# Patient Record
Sex: FEMALE | Race: WHITE | NOT HISPANIC OR LATINO | URBAN - METROPOLITAN AREA
[De-identification: names, ages, dates, MRNs, and addresses within clinical notes are randomized per-mention and may not be internally consistent; named-entity substitution may affect disease eponyms.]

---

## 2017-01-12 ENCOUNTER — APPOINTMENT (OUTPATIENT)
Dept: URBAN - METROPOLITAN AREA CLINIC 200 | Age: 57
Setting detail: DERMATOLOGY
End: 2017-01-12

## 2017-01-12 DIAGNOSIS — K13.0 DISEASES OF LIPS: ICD-10-CM

## 2017-01-12 PROCEDURE — OTHER COUNSELING: OTHER

## 2017-01-12 PROCEDURE — 99212 OFFICE O/P EST SF 10 MIN: CPT

## 2017-01-12 PROCEDURE — OTHER PRESCRIPTION: OTHER

## 2017-01-12 RX ORDER — ALCLOMETASONE DIPROPIONATE 0.5 MG/G
CREAM TOPICAL
Qty: 1 | Refills: 3 | Status: ERX

## 2017-01-12 RX ORDER — KETOCONAZOLE 20 MG/G
CREAM TOPICAL BID
Qty: 1 | Refills: 3 | Status: ERX

## 2017-01-12 RX ORDER — FLUCONAZOLE 150 MG/1
TABLET ORAL
Qty: 2 | Refills: 0 | Status: ERX

## 2017-01-12 ASSESSMENT — LOCATION ZONE DERM: LOCATION ZONE: LIP

## 2017-01-12 ASSESSMENT — LOCATION DETAILED DESCRIPTION DERM
LOCATION DETAILED: LEFT SUPERIOR VERMILION BORDER
LOCATION DETAILED: RIGHT ORAL COMMISSURE

## 2017-01-12 ASSESSMENT — LOCATION SIMPLE DESCRIPTION DERM
LOCATION SIMPLE: RIGHT LIP
LOCATION SIMPLE: LEFT UPPER LIP

## 2017-01-18 ENCOUNTER — APPOINTMENT (OUTPATIENT)
Dept: URBAN - METROPOLITAN AREA CLINIC 200 | Age: 57
Setting detail: DERMATOLOGY
End: 2017-01-19

## 2017-01-18 DIAGNOSIS — L71.0 PERIORAL DERMATITIS: ICD-10-CM

## 2017-01-18 PROCEDURE — OTHER COUNSELING: OTHER

## 2017-01-18 PROCEDURE — OTHER PRESCRIPTION: OTHER

## 2017-01-18 PROCEDURE — 99212 OFFICE O/P EST SF 10 MIN: CPT

## 2017-01-18 RX ORDER — DOXYCYCLINE HYCLATE 50 MG/1
CAPSULE, GELATIN COATED ORAL
Qty: 60 | Refills: 2 | Status: ERX

## 2017-01-18 ASSESSMENT — LOCATION SIMPLE DESCRIPTION DERM
LOCATION SIMPLE: RIGHT LIP
LOCATION SIMPLE: RIGHT CHEEK
LOCATION SIMPLE: RIGHT CHEEK

## 2017-01-18 ASSESSMENT — LOCATION ZONE DERM
LOCATION ZONE: FACE
LOCATION ZONE: FACE
LOCATION ZONE: LIP

## 2017-01-18 ASSESSMENT — LOCATION DETAILED DESCRIPTION DERM
LOCATION DETAILED: RIGHT ORAL COMMISSURE
LOCATION DETAILED: RIGHT CENTRAL BUCCAL CHEEK
LOCATION DETAILED: RIGHT SUPERIOR MEDIAL BUCCAL CHEEK

## 2017-02-15 ENCOUNTER — APPOINTMENT (OUTPATIENT)
Dept: URBAN - METROPOLITAN AREA CLINIC 200 | Age: 57
Setting detail: DERMATOLOGY
End: 2017-02-15

## 2017-02-15 DIAGNOSIS — L70.0 ACNE VULGARIS: ICD-10-CM

## 2017-02-15 PROCEDURE — OTHER INJECTION: OTHER

## 2017-02-15 PROCEDURE — 96372 THER/PROPH/DIAG INJ SC/IM: CPT

## 2017-02-15 ASSESSMENT — LOCATION DETAILED DESCRIPTION DERM
LOCATION DETAILED: RIGHT INFERIOR MEDIAL MALAR CHEEK
LOCATION DETAILED: RIGHT SUPERIOR MEDIAL BUCCAL CHEEK

## 2017-02-15 ASSESSMENT — LOCATION SIMPLE DESCRIPTION DERM: LOCATION SIMPLE: RIGHT CHEEK

## 2017-02-15 ASSESSMENT — LOCATION ZONE DERM: LOCATION ZONE: FACE

## 2017-03-01 ENCOUNTER — RX ONLY (RX ONLY)
Age: 57
End: 2017-03-01

## 2017-03-01 RX ORDER — SULFAMETHOXAZOLE AND TRIMETHOPRIM 400; 80 MG/1; MG/1
TABLET ORAL
Qty: 60 | Refills: 6 | Status: ERX

## 2017-05-19 ENCOUNTER — RX ONLY (RX ONLY)
Age: 57
End: 2017-05-19

## 2017-05-19 RX ORDER — BIMATOPROST 0.3 MG/ML
SOLUTION/ DROPS OPHTHALMIC
Qty: 1 | Refills: 3 | Status: ERX

## 2017-07-22 ENCOUNTER — RX ONLY (RX ONLY)
Age: 57
End: 2017-07-22

## 2017-07-22 RX ORDER — VALACYCLOVIR HYDROCHLORIDE 1 G/1
TABLET, FILM COATED ORAL
Qty: 12 | Refills: 3 | Status: ERX

## 2017-07-22 RX ORDER — VALACYCLOVIR HYDROCHLORIDE 500 MG/1
TABLET, FILM COATED ORAL
Qty: 15 | Refills: 11 | Status: ERX | COMMUNITY
Start: 2017-07-22

## 2017-08-16 ENCOUNTER — RX ONLY (RX ONLY)
Age: 57
End: 2017-08-16

## 2017-08-16 RX ORDER — TRETINOIN 0.5 MG/G
CREAM TOPICAL
Qty: 1 | Refills: 7 | Status: ERX

## 2017-09-29 ENCOUNTER — RX ONLY (RX ONLY)
Age: 57
End: 2017-09-29

## 2017-09-29 ENCOUNTER — APPOINTMENT (OUTPATIENT)
Dept: URBAN - METROPOLITAN AREA CLINIC 200 | Age: 57
Setting detail: DERMATOLOGY
End: 2017-10-02

## 2017-09-29 DIAGNOSIS — L71.0 PERIORAL DERMATITIS: ICD-10-CM

## 2017-09-29 DIAGNOSIS — L72.8 OTHER FOLLICULAR CYSTS OF THE SKIN AND SUBCUTANEOUS TISSUE: ICD-10-CM

## 2017-09-29 PROCEDURE — OTHER PRESCRIPTION: OTHER

## 2017-09-29 PROCEDURE — 11900 INJECT SKIN LESIONS </W 7: CPT

## 2017-09-29 PROCEDURE — OTHER INTRALESIONAL KENALOG: OTHER

## 2017-09-29 PROCEDURE — 99212 OFFICE O/P EST SF 10 MIN: CPT | Mod: 25

## 2017-09-29 RX ORDER — TRIMETHOPRIM 100 MG/1
TABLET ORAL
Qty: 60 | Refills: 5 | Status: ERX

## 2017-09-29 RX ORDER — PIMECROLIMUS 10 MG/G
CREAM TOPICAL
Qty: 1 | Refills: 3 | Status: ERX

## 2017-09-29 ASSESSMENT — LOCATION DETAILED DESCRIPTION DERM
LOCATION DETAILED: LEFT CENTRAL BUCCAL CHEEK
LOCATION DETAILED: RIGHT ORAL COMMISSURE
LOCATION DETAILED: LEFT UPPER CUTANEOUS LIP
LOCATION DETAILED: RIGHT INFERIOR CENTRAL MALAR CHEEK

## 2017-09-29 ASSESSMENT — LOCATION SIMPLE DESCRIPTION DERM
LOCATION SIMPLE: RIGHT CHEEK
LOCATION SIMPLE: LEFT LIP
LOCATION SIMPLE: RIGHT LIP
LOCATION SIMPLE: LEFT CHEEK

## 2017-09-29 ASSESSMENT — INVESTIGATOR STATIC GLOBAL ASSESSMENT
IN YOUR EXPERIENCE, AMONG ALL PATIENTS YOU HAVE SEEN WITH THIS CONDITION, HOW SEVERE IS THIS PATIENT'S CONDITION?: MILD TO MODERATE

## 2017-09-29 ASSESSMENT — LOCATION ZONE DERM
LOCATION ZONE: FACE
LOCATION ZONE: LIP

## 2017-09-29 NOTE — PROCEDURE: INTRALESIONAL KENALOG
Detail Level: Detailed
Expiration Date (Optional): 05/2018
Concentration Of Solution Injected (Mg/Ml): 10.0
Administered By (Optional): ar
X Size Of Lesion In Cm (Optional): 0
Include Z78.9 (Other Specified Conditions Influencing Health Status) As An Associated Diagnosis?: No
Medical Necessity Clause: This procedure was medically necessary because the lesions that were treated were:
Kenalog Preparation: Kenalog
Consent: The risks of atrophy were reviewed with the patient.
Total Volume Injected (Ccs- Only Use Numbers And Decimals): .1

## 2017-10-20 ENCOUNTER — RX ONLY (RX ONLY)
Age: 57
End: 2017-10-20

## 2017-10-20 RX ORDER — UREA 0.4 G/G
OINTMENT TOPICAL
Qty: 1 | Refills: 3 | Status: ERX

## 2017-10-24 ENCOUNTER — RX ONLY (RX ONLY)
Age: 57
End: 2017-10-24

## 2017-10-24 RX ORDER — UREA 40 G/100G
CREAM TOPICAL
Qty: 1 | Refills: 7 | Status: ERX

## 2017-12-05 ENCOUNTER — APPOINTMENT (OUTPATIENT)
Dept: URBAN - METROPOLITAN AREA CLINIC 200 | Age: 57
Setting detail: DERMATOLOGY
End: 2017-12-11

## 2017-12-05 DIAGNOSIS — B35.1 TINEA UNGUIUM: ICD-10-CM

## 2017-12-05 DIAGNOSIS — L57.8 OTHER SKIN CHANGES DUE TO CHRONIC EXPOSURE TO NONIONIZING RADIATION: ICD-10-CM

## 2017-12-05 DIAGNOSIS — D22 MELANOCYTIC NEVI: ICD-10-CM

## 2017-12-05 PROBLEM — D22.5 MELANOCYTIC NEVI OF TRUNK: Status: ACTIVE | Noted: 2017-12-05

## 2017-12-05 PROBLEM — J30.1 ALLERGIC RHINITIS DUE TO POLLEN: Status: ACTIVE | Noted: 2017-12-05

## 2017-12-05 PROCEDURE — OTHER NAIL CLIPPING FOR PAS: OTHER

## 2017-12-05 PROCEDURE — OTHER PRESCRIPTION: OTHER

## 2017-12-05 PROCEDURE — OTHER COUNSELING: OTHER

## 2017-12-05 PROCEDURE — 99213 OFFICE O/P EST LOW 20 MIN: CPT

## 2017-12-05 RX ORDER — EFINACONAZOLE 100 MG/ML
SOLUTION TOPICAL
Qty: 1 | Refills: 6

## 2017-12-05 ASSESSMENT — LOCATION ZONE DERM
LOCATION ZONE: TOENAIL
LOCATION ZONE: TOE
LOCATION ZONE: TRUNK

## 2017-12-05 ASSESSMENT — LOCATION DETAILED DESCRIPTION DERM
LOCATION DETAILED: LEFT DISTAL PLANTAR 3RD TOE
LOCATION DETAILED: LEFT 3RD TOENAIL
LOCATION DETAILED: UPPER STERNUM

## 2017-12-05 ASSESSMENT — LOCATION SIMPLE DESCRIPTION DERM
LOCATION SIMPLE: CHEST
LOCATION SIMPLE: LEFT 3RD TOE
LOCATION SIMPLE: LEFT 3RD TOE

## 2018-01-10 RX ORDER — EFINACONAZOLE 100 MG/ML
SOLUTION TOPICAL
Qty: 1 | Refills: 6 | Status: ERX

## 2018-01-12 ENCOUNTER — RX ONLY (RX ONLY)
Age: 58
End: 2018-01-12

## 2018-01-12 RX ORDER — CICLOPIROX 80 MG/ML
SOLUTION TOPICAL
Qty: 1 | Refills: 3 | Status: ERX

## 2018-04-04 ENCOUNTER — RX ONLY (RX ONLY)
Age: 58
End: 2018-04-04

## 2018-04-04 RX ORDER — BIMATOPROST 0.3 MG/ML
SOLUTION/ DROPS OPHTHALMIC
Qty: 1 | Refills: 3 | Status: ERX

## 2018-07-27 ENCOUNTER — RX ONLY (RX ONLY)
Age: 58
End: 2018-07-27

## 2018-07-27 RX ORDER — BIMATOPROST 0.3 MG/ML
SOLUTION/ DROPS OPHTHALMIC
Qty: 1 | Refills: 3 | Status: ERX

## 2018-08-22 ENCOUNTER — RX ONLY (RX ONLY)
Age: 58
End: 2018-08-22

## 2018-08-22 RX ORDER — TRETINOIN 0.5 MG/G
CREAM TOPICAL
Qty: 1 | Refills: 7 | Status: ERX

## 2018-08-22 RX ORDER — ALUMINUM CHLORIDE 20 %
SOLUTION, NON-ORAL TOPICAL
Qty: 1 | Refills: 0 | Status: ERX

## 2018-10-04 ENCOUNTER — TRANSCRIBE ORDERS (OUTPATIENT)
Dept: SCHEDULING | Age: 58
End: 2018-10-04

## 2018-10-04 DIAGNOSIS — Z12.31 ENCOUNTER FOR SCREENING MAMMOGRAM FOR MALIGNANT NEOPLASM OF BREAST: Primary | ICD-10-CM

## 2018-10-04 DIAGNOSIS — M85.80 OTHER SPECIFIED DISORDERS OF BONE DENSITY AND STRUCTURE, UNSPECIFIED SITE: ICD-10-CM

## 2018-10-04 DIAGNOSIS — Z01.419 ENCOUNTER FOR GYNECOLOGICAL EXAMINATION WITHOUT ABNORMAL FINDING: ICD-10-CM

## 2018-10-19 ENCOUNTER — HOSPITAL ENCOUNTER (OUTPATIENT)
Dept: RADIOLOGY | Facility: HOSPITAL | Age: 58
Discharge: HOME | End: 2018-10-19
Attending: OBSTETRICS & GYNECOLOGY
Payer: COMMERCIAL

## 2018-10-19 DIAGNOSIS — Z12.31 ENCOUNTER FOR SCREENING MAMMOGRAM FOR MALIGNANT NEOPLASM OF BREAST: ICD-10-CM

## 2018-10-19 PROCEDURE — 77067 SCR MAMMO BI INCL CAD: CPT

## 2018-10-30 ENCOUNTER — APPOINTMENT (OUTPATIENT)
Dept: URBAN - METROPOLITAN AREA CLINIC 200 | Age: 58
Setting detail: DERMATOLOGY
End: 2018-10-30

## 2018-10-30 ENCOUNTER — APPOINTMENT (OUTPATIENT)
Dept: URBAN - METROPOLITAN AREA CLINIC 200 | Age: 58
Setting detail: DERMATOLOGY
End: 2018-10-31

## 2018-10-30 DIAGNOSIS — B36.0 PITYRIASIS VERSICOLOR: ICD-10-CM

## 2018-10-30 DIAGNOSIS — L57.8 OTHER SKIN CHANGES DUE TO CHRONIC EXPOSURE TO NONIONIZING RADIATION: ICD-10-CM

## 2018-10-30 DIAGNOSIS — L65.9 NONSCARRING HAIR LOSS, UNSPECIFIED: ICD-10-CM

## 2018-10-30 DIAGNOSIS — L70.0 ACNE VULGARIS: ICD-10-CM

## 2018-10-30 PROCEDURE — OTHER COUNSELING: OTHER

## 2018-10-30 PROCEDURE — 99213 OFFICE O/P EST LOW 20 MIN: CPT

## 2018-10-30 PROCEDURE — OTHER ORDER TESTS: OTHER

## 2018-10-30 PROCEDURE — OTHER PRESCRIPTION: OTHER

## 2018-10-30 PROCEDURE — OTHER MIPS QUALITY: OTHER

## 2018-10-30 PROCEDURE — OTHER PRESCRIPTION MEDICATION MANAGEMENT: OTHER

## 2018-10-30 RX ORDER — KETOCONAZOLE 20 MG/G
CREAM TOPICAL
Qty: 1 | Refills: 6 | Status: ERX

## 2018-10-30 ASSESSMENT — LOCATION DETAILED DESCRIPTION DERM
LOCATION DETAILED: LEFT MEDIAL SUPERIOR CHEST
LOCATION DETAILED: RIGHT SUPERIOR MEDIAL BUCCAL CHEEK
LOCATION DETAILED: RIGHT MEDIAL FRONTAL SCALP
LOCATION DETAILED: LEFT MEDIAL FRONTAL SCALP

## 2018-10-30 ASSESSMENT — LOCATION ZONE DERM
LOCATION ZONE: TRUNK
LOCATION ZONE: SCALP
LOCATION ZONE: FACE

## 2018-10-30 ASSESSMENT — LOCATION SIMPLE DESCRIPTION DERM
LOCATION SIMPLE: RIGHT SCALP
LOCATION SIMPLE: RIGHT CHEEK
LOCATION SIMPLE: LEFT SCALP
LOCATION SIMPLE: CHEST

## 2018-10-30 NOTE — PROCEDURE: PRESCRIPTION MEDICATION MANAGEMENT
Detail Level: Zone
Render In Strict Bullet Format?: No
Modify Regimen: increase spironolactone to 100 mg bid
Discontinue Regimen: 5% minoxidil
Initiate Treatment: 2% minoxidil mixed with latisse

## 2019-02-04 ENCOUNTER — TRANSCRIBE ORDERS (OUTPATIENT)
Dept: SCHEDULING | Age: 59
End: 2019-02-04

## 2019-02-04 DIAGNOSIS — K59.09 OTHER CONSTIPATION: Primary | ICD-10-CM

## 2019-02-06 ENCOUNTER — OFFICE VISIT (OUTPATIENT)
Dept: UROGYNECOLOGY | Facility: CLINIC | Age: 59
End: 2019-02-06
Payer: COMMERCIAL

## 2019-02-06 VITALS — DIASTOLIC BLOOD PRESSURE: 70 MMHG | WEIGHT: 123 LBS | SYSTOLIC BLOOD PRESSURE: 130 MMHG

## 2019-02-06 DIAGNOSIS — N39.42 URINARY INCONTINENCE WITHOUT SENSORY AWARENESS: Primary | ICD-10-CM

## 2019-02-06 DIAGNOSIS — Z96.0 HISTORY OF PUBOVAGINAL SLING: ICD-10-CM

## 2019-02-06 DIAGNOSIS — K59.01 SLOW TRANSIT CONSTIPATION: ICD-10-CM

## 2019-02-06 PROBLEM — K59.00 CONSTIPATION: Status: ACTIVE | Noted: 2019-02-06

## 2019-02-06 PROCEDURE — 99204 OFFICE O/P NEW MOD 45 MIN: CPT | Mod: 25 | Performed by: OBSTETRICS & GYNECOLOGY

## 2019-02-06 PROCEDURE — 51701 INSERT BLADDER CATHETER: CPT | Performed by: OBSTETRICS & GYNECOLOGY

## 2019-02-06 ASSESSMENT — ENCOUNTER SYMPTOMS
FEVER: 0
PALPITATIONS: 0
NERVOUS/ANXIOUS: 0
POLYPHAGIA: 0
CHILLS: 0
COUGH: 0
DYSURIA: 0
SHORTNESS OF BREATH: 0
ABDOMINAL PAIN: 0
ROS GI COMMENTS: NEGATIVE FOR FECAL INCONTINENCE
NUMBNESS: 0
BRUISES/BLEEDS EASILY: 0
DYSPHORIC MOOD: 0
DIARRHEA: 0
DIFFICULTY URINATING: 0
UNEXPECTED WEIGHT CHANGE: 0
ABDOMINAL DISTENTION: 0
ADENOPATHY: 0
FLANK PAIN: 0
FREQUENCY: 0
CONSTIPATION: 1

## 2019-02-06 NOTE — PROGRESS NOTES
Visit Date: 2/6/2019   Jalil Guerra is 58 y.o. female who is referred for evaluation of urinary incontinence.  She previously had a TVT sling in 2014 by me. She was last seen in 2014.  She has leakage during the day without awareness.  Wears a few pads a day.     She has had resolution of her JOSEFINA.  She has urinary urgency most days.  Denies nocturia.  She can be damp at night. Empties well in the morning. Voids 3 - 5 times a day.  No bladder infections. No vaginal bleeding or pain.  She is five years postmenopausal.    She has chronic constipation, and is on Linzess. She has two BM a week    /70   Wt 55.8 kg (123 lb)     Medications:   Current Outpatient Prescriptions:   •  linaclotide (LINZESS) 145 mcg capsule, Take 145 mcg by mouth daily before breakfast., Disp: , Rfl:   •  spironolactone (ALDACTONE) 100 mg tablet, Take 100 mg by mouth daily., Disp: , Rfl:   •  valACYclovir (VALTREX) 500 mg tablet, Take 250 mg by mouth daily., Disp: , Rfl:     Allergies: is allergic to no known drug allergies.     Past Medical History:  has a past medical history of Acne; Constipation; Migraine; and Osteoporosis.  Past Surgical History:  has a past surgical history that includes Cholecystectomy; Ankle surgery (Right); and Pubovaginal sling.  Family History: family history includes Diverticulitis in her mother.  Social History:  reports that she has never smoked. She has never used smokeless tobacco. She reports that she does not drink alcohol or use drugs.      Review of Systems   Constitutional: Negative for chills, fever and unexpected weight change.   Respiratory: Negative for cough and shortness of breath.    Cardiovascular: Negative for chest pain, palpitations and leg swelling.   Gastrointestinal: Positive for constipation. Negative for abdominal distention, abdominal pain and diarrhea.        Negative for fecal incontinence   Endocrine: Negative for polyphagia.   Genitourinary: Negative for difficulty urinating,  dysuria, enuresis, flank pain, frequency, nocturia, pelvic pain, urgency and vaginal discharge.        + incontinence   Skin: Negative for rash.   Neurological: Negative for numbness.   Hematological: Negative for adenopathy. Does not bruise/bleed easily.   Psychiatric/Behavioral: Negative for dysphoric mood. The patient is not nervous/anxious.      Physical Exam   Constitutional: She is oriented to person, place, and time. She appears well-developed and well-nourished.   Genitourinary: Uterus normal. Pelvic exam was performed with patient in lithotomy exam position.     External female genitalia normal.   Urethral meatus normal.   Urethra normal. There is no urethral urethrocele or urethral diverticulum. No stress urinary incontinence (No sling exposure).  Normal bladder. There is no cystocele, uterine prolapse, rectocele or enterocele present.   Cervix exam normal.  Uterus is normal.   Adnexa normal.   Rectal exam shows no rectal prolapse and no external hemorrhoid.   Neck: No JVD present. No thyromegaly present.   Cardiovascular: Normal pulses.    No  JVD  No peripheral edema  Peripheral vascular normal   Pulmonary/Chest: Effort normal. No tachypnea. No respiratory distress.   Abdominal: Normal appearance. She exhibits no distension and no ascites. There is no hepatosplenomegaly. There is no tenderness. There is no CVA tenderness. No hernia.   Lymphadenopathy:        Right: No inguinal adenopathy present.        Left: No inguinal adenopathy present.   Neurological: She is alert and oriented to person, place, and time. No sensory deficit.   Skin: No bruising and no rash noted.   Psychiatric: Her behavior is normal. Her affect is not inappropriate. Cognition and memory are normal. She is attentive.       Assessment/Plan     Urinary incontinence without sensory awareness  She describes leakage without awareness  She had a prior sling  PVR is normal  I do not think that this is JOSEFINA  uribel test  Three day voiding  diary  RV UD testing    Constipation  This is stable    History of pubovaginal sling  She is s/p TVT sling  Her JOSEFINA is cured   no mesh related complications      Return for urodynamics.     Jakob Sparks MD

## 2019-02-06 NOTE — PROCEDURES
Procedures  Procedure Note:  (85268) The urethra was prepped, and a lubricated 12 [place] catheter was inserted to collect a post void residual.  The procedure was well tolerated by the patient  The PVR amount is recorded (20 ml)

## 2019-02-06 NOTE — ASSESSMENT & PLAN NOTE
She describes leakage without awareness  She had a prior sling  PVR is normal  I do not think that this is JOSEFINA  uribel test  Three day voiding diary  RV UD testing

## 2019-02-18 ENCOUNTER — HOSPITAL ENCOUNTER (OUTPATIENT)
Dept: RADIOLOGY | Facility: HOSPITAL | Age: 59
Discharge: HOME | End: 2019-02-18
Attending: INTERNAL MEDICINE
Payer: COMMERCIAL

## 2019-02-18 DIAGNOSIS — K59.09 OTHER CONSTIPATION: ICD-10-CM

## 2019-02-18 PROCEDURE — 63600105 HC IODINE BASED CONTRAST

## 2019-02-18 PROCEDURE — 25500000 HC DRUGS/INCIDENT RAD

## 2019-02-18 PROCEDURE — 74177 CT ABD & PELVIS W/CONTRAST: CPT

## 2019-02-18 RX ADMIN — IOHEXOL 110 ML: 300 INJECTION, SOLUTION INTRAVENOUS at 14:12

## 2019-02-18 RX ADMIN — BARIUM SULFATE 900 ML: 21 SUSPENSION ORAL at 13:00

## 2019-02-20 ENCOUNTER — APPOINTMENT (OUTPATIENT)
Dept: LAB | Facility: HOSPITAL | Age: 59
End: 2019-02-20
Attending: PHYSICIAN ASSISTANT
Payer: COMMERCIAL

## 2019-02-20 ENCOUNTER — OFFICE VISIT (OUTPATIENT)
Dept: GYNECOLOGIC ONCOLOGY | Facility: CLINIC | Age: 59
End: 2019-02-20
Attending: OBSTETRICS & GYNECOLOGY
Payer: COMMERCIAL

## 2019-02-20 ENCOUNTER — PREP FOR CASE (OUTPATIENT)
Dept: GYNECOLOGIC ONCOLOGY | Facility: CLINIC | Age: 59
End: 2019-02-20

## 2019-02-20 ENCOUNTER — HOSPITAL ENCOUNTER (OUTPATIENT)
Dept: CARDIOLOGY | Facility: HOSPITAL | Age: 59
Discharge: HOME | End: 2019-02-20
Attending: OBSTETRICS & GYNECOLOGY
Payer: COMMERCIAL

## 2019-02-20 VITALS
HEIGHT: 61 IN | DIASTOLIC BLOOD PRESSURE: 87 MMHG | BODY MASS INDEX: 22.84 KG/M2 | WEIGHT: 121 LBS | SYSTOLIC BLOOD PRESSURE: 133 MMHG | HEART RATE: 83 BPM

## 2019-02-20 DIAGNOSIS — N83.202 LEFT OVARIAN CYST: ICD-10-CM

## 2019-02-20 DIAGNOSIS — Z01.818 ENCOUNTER FOR PREADMISSION TESTING: ICD-10-CM

## 2019-02-20 DIAGNOSIS — N83.202 LEFT OVARIAN CYST: Primary | ICD-10-CM

## 2019-02-20 DIAGNOSIS — N83.292 COMPLEX CYST OF LEFT OVARY: Primary | ICD-10-CM

## 2019-02-20 LAB
ABO + RH BLD: NORMAL
ALBUMIN SERPL-MCNC: 4.2 G/DL (ref 3.4–5)
ALP SERPL-CCNC: 67 IU/L (ref 35–126)
ALT SERPL-CCNC: 34 IU/L (ref 11–54)
ANION GAP SERPL CALC-SCNC: 8 MEQ/L (ref 3–15)
AST SERPL-CCNC: 26 IU/L (ref 15–41)
ATRIAL RATE: 56
BILIRUB SERPL-MCNC: 0.7 MG/DL (ref 0.3–1.2)
BLD GP AB SCN SERPL QL: NEGATIVE
BLOOD BANK CMNT PATIENT-IMP: NORMAL
BUN SERPL-MCNC: 15 MG/DL (ref 8–20)
CALCIUM SERPL-MCNC: 9.8 MG/DL (ref 8.9–10.3)
CANCER AG125 SERPL-ACNC: 18 U/ML (ref 0–35)
CHLORIDE SERPL-SCNC: 106 MEQ/L (ref 98–109)
CO2 SERPL-SCNC: 28 MEQ/L (ref 22–32)
CREAT SERPL-MCNC: 0.8 MG/DL
D AG BLD QL: POSITIVE
ERYTHROCYTE [DISTWIDTH] IN BLOOD BY AUTOMATED COUNT: 13.1 % (ref 11.7–14.4)
GFR SERPL CREATININE-BSD FRML MDRD: >60 ML/MIN/1.73M*2
GLUCOSE SERPL-MCNC: 84 MG/DL (ref 70–99)
HCT VFR BLDCO AUTO: 45.7 %
HGB BLD-MCNC: 14.7 G/DL
LABORATORY COMMENT REPORT: NORMAL
MCH RBC QN AUTO: 30.4 PG (ref 28–33.2)
MCHC RBC AUTO-ENTMCNC: 32.2 G/DL (ref 32.2–35.5)
MCV RBC AUTO: 94.6 FL (ref 83–98)
P AXIS: 58
PDW BLD AUTO: 10.9 FL (ref 9.4–12.3)
PLATELET # BLD AUTO: 305 K/UL
POTASSIUM SERPL-SCNC: 4.6 MEQ/L (ref 3.6–5.1)
PR INTERVAL: 154
PROT SERPL-MCNC: 6.9 G/DL (ref 6–8.2)
QRS DURATION: 86
QT INTERVAL: 434
QTC CALCULATION(BAZETT): 418
R AXIS: 72
RBC # BLD AUTO: 4.83 M/UL (ref 3.93–5.22)
SODIUM SERPL-SCNC: 142 MEQ/L (ref 136–144)
T WAVE AXIS: 36
VENTRICULAR RATE: 56
WBC # BLD AUTO: 8.64 K/UL

## 2019-02-20 PROCEDURE — 85027 COMPLETE CBC AUTOMATED: CPT

## 2019-02-20 PROCEDURE — 80053 COMPREHEN METABOLIC PANEL: CPT

## 2019-02-20 PROCEDURE — 86900 BLOOD TYPING SEROLOGIC ABO: CPT

## 2019-02-20 PROCEDURE — 86304 IMMUNOASSAY TUMOR CA 125: CPT

## 2019-02-20 PROCEDURE — 36415 COLL VENOUS BLD VENIPUNCTURE: CPT

## 2019-02-20 PROCEDURE — 93010 ELECTROCARDIOGRAM REPORT: CPT | Performed by: INTERNAL MEDICINE

## 2019-02-20 PROCEDURE — 93005 ELECTROCARDIOGRAM TRACING: CPT

## 2019-02-20 PROCEDURE — 99204 OFFICE O/P NEW MOD 45 MIN: CPT | Performed by: OBSTETRICS & GYNECOLOGY

## 2019-02-20 RX ORDER — LINACLOTIDE 290 UG/1
CAPSULE, GELATIN COATED ORAL
Refills: 10 | COMMUNITY
Start: 2019-02-05 | End: 2022-04-04

## 2019-02-20 NOTE — LETTER
February 21, 2019    Patient: Jalil Guerra   YOB: 1960   Date of Visit: 2/20/2019       Dear Dr. Fields:    The patient is seen back at the [facility] today in follow up with regard to her   1. Complex cyst of left ovary    . Below is my assessment and plan of care.    Assesment:  Problem List Items Addressed This Visit     Complex cyst of left ovary - Primary    Overview     6 months pelvic discomfort, leaking urine, bloating. 15lbs weight gain over past year. Seen by Dr. Sparks who did not think it was her GSUI treated 2014 with TVT.    10 years infertity meds, IVF    2/18/19 CT 6.1 x 7.7 x 6.7 cm septated left ovarian cyst, no omental caking, lymphadenopathy, or ascites.         Current Assessment & Plan     Long discussion with patient on the proper standard treatment of care for her specific diagnosis. We discussed the surgical procedure of the laparoscopic removal of left tube and ovary. We reviewed all the risks of this procedure such as bleeding,injury,and infection.  Options of pelvic ultrasound and Ca1 25 were discussed.  All questions were answered and patient signed consent.  Patient wishes to schedule surgery on February 26.                    Sincerely,      Kennedy Montanez MD  CC: MD Josette Lucas MD

## 2019-02-20 NOTE — ASSESSMENT & PLAN NOTE
Long discussion with patient on the proper standard treatment of care for her specific diagnosis. We discussed the surgical procedure of the laparoscopic removal of left tube and ovary. We reviewed all the risks of this procedure such as bleeding,injury,and infection.  Options of pelvic ultrasound and Ca1 25 were discussed.  All questions were answered and patient signed consent.  Patient wishes to schedule surgery on February 26.

## 2019-02-20 NOTE — PROGRESS NOTES
"Jalil Guerra presents to the office for  second opinion. She is here to discuss recent findings of of ovarian cyst. She was diagnosed on CT scan by Dr. Carmichael on 02/18/2019. She has been experiencing severe bloating/abd distension, and weight gain over the past 6 months per patient. Patient denies any vaginal bleeding, vaginal odor, vaginal discharge, nausea, vomiting, fevers, chills, vomiting, lack of appetite, sob, wheezing, cough.  She does report constipation and sees GI and is managed with lynzess. She also reports a \"leaky bladder\".    Problem List Items Addressed This Visit     Complex cyst of left ovary - Primary    Overview     6 months pelvic discomfort, leaking urine, bloating. 15lbs weight gain over past year. Seen by Dr. Sparks who did not think it was her GSUI treated 2014 with TVT.    10 years infertity meds, IVF    2/18/19 CT 6.1 x 7.7 x 6.7 cm septated left ovarian cyst, no omental caking, lymphadenopathy, or ascites.         Current Assessment & Plan     Long discussion with patient on the proper standard treatment of care for her specific diagnosis. We discussed the surgical procedure of the laparoscopic removal of left tube and ovary. We reviewed all the risks of this procedure such as bleeding,injury,and infection.  Options of pelvic ultrasound and Ca1 25 were discussed.  All questions were answered and patient signed consent.  Patient wishes to schedule surgery on February 26.                     Past Medical History:   Diagnosis Date   • Acne    • Constipation    • Migraine    • Osteoporosis      Past Surgical History:   Procedure Laterality Date   • ANKLE SURGERY Right     Reconstruction   • CHOLECYSTECTOMY     • PUBOVAGINAL SLING         Current Outpatient Prescriptions:   •  LINZESS 290 mcg capsule, Take by mouth once daily., Disp: , Rfl: 10  •  spironolactone (ALDACTONE) 100 mg tablet, Take 100 mg by mouth daily., Disp: , Rfl:   •  valACYclovir (VALTREX) 500 mg tablet, Take 250 mg by " "mouth daily., Disp: , Rfl:   •  linaclotide (LINZESS) 145 mcg capsule, Take 145 mcg by mouth daily before breakfast., Disp: , Rfl:   No known drug allergies  Cancer-related family history is negative for Breast cancer, Ovarian cancer, and Colon cancer.   reports that she has never smoked. She has never used smokeless tobacco. She reports that she does not drink alcohol or use drugs.  ROS: Negative in all systems with the exception of the above    Physical Exam:  Physical examination: Well-developed female in no apparent distress  Vitals: BP: 133/87  Heart Rate: 83  Height: 154.9 cm (5' 1\")  Weight: 54.9 kg (121 lb) (02/20 1001)   Vitals:    02/20/19 1001   BP: 133/87   Pulse: 83   Body mass index is 22.86 kg/m².      HEENT: Normocephalic, atraumatic, nonicteric sclera  Neck: Supple no masses, thyroid normal nontender  Lymphatic: No inguinal or supraclavicular adenopathy  Heart: Regular rate no murmurs  Lungs: Clear to auscultation with good respiratory effort  Extremities: No clubbing, cyanosis, edema  Neuro: Oriented ×3 with normal mood and affect  Abdomen: Soft, nontender, nondistended. No hepatosplenomegaly. No rebound or guarding.  Gynecologic: Normal vulva vagina urethra meatus bladder. Normal cervix, uterus, no cervical motion tenderness, normal small mobile 6cm mass left adnexa, mild tenderness upon palpation.  Normal right adnexa      Imaging 02/18/2019: CT ABD/Pelvis:     6.1 x 7.7 x 6.7 cm septated left ovarian cyst for which neoplasm  must is; further evaluation advised.  Status post cholecystectomy.  I have personally reviewed images of patient's CAT scan of the abdomen and pelvis.    Assessment/Plan      Assesment:  Problem List Items Addressed This Visit     Complex cyst of left ovary - Primary    Overview     6 months pelvic discomfort, leaking urine, bloating. 15lbs weight gain over past year. Seen by Dr. Sparks who did not think it was her GSUI treated 2014 with TVT.    10 years infertity meds, " IVF    2/18/19 CT 6.1 x 7.7 x 6.7 cm septated left ovarian cyst, no omental caking, lymphadenopathy, or ascites.         Current Assessment & Plan     Long discussion with patient on the proper standard treatment of care for her specific diagnosis. We discussed the surgical procedure of the laparoscopic removal of left tube and ovary. We reviewed all the risks of this procedure such as bleeding,injury,and infection.  Options of pelvic ultrasound and Ca1 25 were discussed.  All questions were answered and patient signed consent.  Patient wishes to schedule surgery on February 26.

## 2019-02-21 ENCOUNTER — TELEPHONE (OUTPATIENT)
Dept: GYNECOLOGIC ONCOLOGY | Facility: CLINIC | Age: 59
End: 2019-02-21

## 2019-02-22 RX ORDER — SODIUM CHLORIDE, SODIUM GLUCONATE, SODIUM ACETATE, POTASSIUM CHLORIDE AND MAGNESIUM CHLORIDE 30; 37; 368; 526; 502 MG/100ML; MG/100ML; MG/100ML; MG/100ML; MG/100ML
80 INJECTION, SOLUTION INTRAVENOUS CONTINUOUS
Status: CANCELLED | OUTPATIENT
Start: 2019-02-26 | End: 2019-02-27

## 2019-02-22 RX ORDER — ACETAMINOPHEN 325 MG/1
975 TABLET ORAL ONCE
Status: CANCELLED | OUTPATIENT
Start: 2019-02-22 | End: 2019-02-22

## 2019-02-22 RX ORDER — CELECOXIB 100 MG/1
400 CAPSULE ORAL ONCE
Status: CANCELLED | OUTPATIENT
Start: 2019-02-22 | End: 2019-02-22

## 2019-02-22 NOTE — H&P
"Progress Notes       Kennedy Montanez MD   Gynecologic Oncology        Jalil Guerra presents to the office for  second opinion. She is here to discuss recent findings of of ovarian cyst. She was diagnosed on CT scan by Dr. Carmichael on 02/18/2019. She has been experiencing severe bloating/abd distension, and weight gain over the past 6 months per patient. Patient denies any vaginal bleeding, vaginal odor, vaginal discharge, nausea, vomiting, fevers, chills, vomiting, lack of appetite, sob, wheezing, cough.  She does report constipation and sees GI and is managed with lynzess. She also reports a \"leaky bladder\".         Problem List Items Addressed This Visit      Complex cyst of left ovary - Primary     Overview       6 months pelvic discomfort, leaking urine, bloating. 15lbs weight gain over past year. Seen by Dr. Sparks who did not think it was her GSUI treated 2014 with TVT.     10 years infertity meds, IVF     2/18/19 CT 6.1 x 7.7 x 6.7 cm septated left ovarian cyst, no omental caking, lymphadenopathy, or ascites.           Current Assessment & Plan       Long discussion with patient on the proper standard treatment of care for her specific diagnosis. We discussed the surgical procedure of the laparoscopic removal of left tube and ovary. We reviewed all the risks of this procedure such as bleeding,injury,and infection.  Options of pelvic ultrasound and Ca1 25 were discussed.  All questions were answered and patient signed consent.  Patient wishes to schedule surgery on February 26.                            Medical History        Past Medical History:   Diagnosis Date   • Acne     • Constipation     • Migraine     • Osteoporosis           Surgical History         Past Surgical History:   Procedure Laterality Date   • ANKLE SURGERY Right       Reconstruction   • CHOLECYSTECTOMY       • PUBOVAGINAL SLING                Current Outpatient Prescriptions:   •  LINZESS 290 mcg capsule, Take by mouth once daily., " "Disp: , Rfl: 10  •  spironolactone (ALDACTONE) 100 mg tablet, Take 100 mg by mouth daily., Disp: , Rfl:   •  valACYclovir (VALTREX) 500 mg tablet, Take 250 mg by mouth daily., Disp: , Rfl:   •  linaclotide (LINZESS) 145 mcg capsule, Take 145 mcg by mouth daily before breakfast., Disp: , Rfl:   No known drug allergies  Cancer-related family history is negative for Breast cancer, Ovarian cancer, and Colon cancer.   reports that she has never smoked. She has never used smokeless tobacco. She reports that she does not drink alcohol or use drugs.  ROS: Negative in all systems with the exception of the above     Physical Exam:  Physical examination: Well-developed female in no apparent distress  Vitals: BP: 133/87  Heart Rate: 83  Height: 154.9 cm (5' 1\")  Weight: 54.9 kg (121 lb) (02/20 1001)       Vitals:     02/20/19 1001   BP: 133/87   Pulse: 83   Body mass index is 22.86 kg/m².        HEENT: Normocephalic, atraumatic, nonicteric sclera  Neck: Supple no masses, thyroid normal nontender  Lymphatic: No inguinal or supraclavicular adenopathy  Heart: Regular rate no murmurs  Lungs: Clear to auscultation with good respiratory effort  Extremities: No clubbing, cyanosis, edema  Neuro: Oriented ×3 with normal mood and affect  Abdomen: Soft, nontender, nondistended. No hepatosplenomegaly. No rebound or guarding.  Gynecologic: Normal vulva vagina urethra meatus bladder. Normal cervix, uterus, no cervical motion tenderness, normal small mobile 6cm mass left adnexa, mild tenderness upon palpation.  Normal right adnexa        Imaging 02/18/2019: CT ABD/Pelvis:      6.1 x 7.7 x 6.7 cm septated left ovarian cyst for which neoplasm  must is; further evaluation advised.  Status post cholecystectomy.  I have personally reviewed images of patient's CAT scan of the abdomen and pelvis.        Assessment/Plan          Assesment:      Problem List Items Addressed This Visit      Complex cyst of left ovary - Primary     Overview       6 " months pelvic discomfort, leaking urine, bloating. 15lbs weight gain over past year. Seen by Dr. Sparks who did not think it was her GSUI treated 2014 with TVT.     10 years infertity meds, IVF     2/18/19 CT 6.1 x 7.7 x 6.7 cm septated left ovarian cyst, no omental caking, lymphadenopathy, or ascites.           Current Assessment & Plan       Long discussion with patient on the proper standard treatment of care for her specific diagnosis. We discussed the surgical procedure of the laparoscopic removal of left tube and ovary. We reviewed all the risks of this procedure such as bleeding,injury,and infection.  Options of pelvic ultrasound and Ca1 25 were discussed.  All questions were answered and patient signed consent.  Patient wishes to schedule surgery on February 26.

## 2019-02-25 ENCOUNTER — ANESTHESIA EVENT (OUTPATIENT)
Dept: OPERATING ROOM | Facility: HOSPITAL | Age: 59
Setting detail: HOSPITAL OUTPATIENT SURGERY
End: 2019-02-25
Payer: COMMERCIAL

## 2019-02-25 NOTE — PROGRESS NOTES
Herson  Received your letter on our patient Jalil Guerra.  I never completed my evaluation of her complaints of urinary leakage without sensory awareness.  Clinically, her history did not suggest JOSEFINA, however, I did recommend further evaluation of her complaints by a combination of a uribel pad test, three day voiding diary, and urodynamic testing. (I have documented this in my note).  She has not followed up with me on any of these.    I would be happy to see her before her gyn surgery if you would like to complete the evaluation

## 2019-02-26 ENCOUNTER — HOSPITAL ENCOUNTER (OUTPATIENT)
Facility: HOSPITAL | Age: 59
Setting detail: HOSPITAL OUTPATIENT SURGERY
Discharge: HOME | End: 2019-02-26
Attending: OBSTETRICS & GYNECOLOGY | Admitting: OBSTETRICS & GYNECOLOGY
Payer: COMMERCIAL

## 2019-02-26 ENCOUNTER — ANESTHESIA (OUTPATIENT)
Dept: OPERATING ROOM | Facility: HOSPITAL | Age: 59
Setting detail: HOSPITAL OUTPATIENT SURGERY
End: 2019-02-26
Payer: COMMERCIAL

## 2019-02-26 VITALS
OXYGEN SATURATION: 99 % | HEART RATE: 78 BPM | RESPIRATION RATE: 18 BRPM | DIASTOLIC BLOOD PRESSURE: 59 MMHG | TEMPERATURE: 97.4 F | SYSTOLIC BLOOD PRESSURE: 130 MMHG

## 2019-02-26 DIAGNOSIS — N83.299 OTHER OVARIAN CYST, UNSPECIFIED SIDE: ICD-10-CM

## 2019-02-26 PROCEDURE — 71000011 HC PACU PHASE 1 EA ADDL MIN: Performed by: OBSTETRICS & GYNECOLOGY

## 2019-02-26 PROCEDURE — 63600000 HC DRUGS/DETAIL CODE: Performed by: NURSE ANESTHETIST, CERTIFIED REGISTERED

## 2019-02-26 PROCEDURE — 63600000 HC DRUGS/DETAIL CODE: Performed by: OBSTETRICS & GYNECOLOGY

## 2019-02-26 PROCEDURE — 71000012 HC PACU PHASE 2 EA ADDL MIN: Performed by: OBSTETRICS & GYNECOLOGY

## 2019-02-26 PROCEDURE — 0UT04ZZ RESECTION OF RIGHT OVARY, PERCUTANEOUS ENDOSCOPIC APPROACH: ICD-10-PCS | Performed by: OBSTETRICS & GYNECOLOGY

## 2019-02-26 PROCEDURE — 37000001 HC ANESTHESIA GENERAL: Performed by: OBSTETRICS & GYNECOLOGY

## 2019-02-26 PROCEDURE — 0UT74ZZ RESECTION OF BILATERAL FALLOPIAN TUBES, PERCUTANEOUS ENDOSCOPIC APPROACH: ICD-10-PCS | Performed by: OBSTETRICS & GYNECOLOGY

## 2019-02-26 PROCEDURE — 27200000 HC STERILE SUPPLY: Performed by: OBSTETRICS & GYNECOLOGY

## 2019-02-26 PROCEDURE — 63700000 HC SELF-ADMINISTRABLE DRUG: Performed by: OBSTETRICS & GYNECOLOGY

## 2019-02-26 PROCEDURE — 58661 LAPAROSCOPY REMOVE ADNEXA: CPT | Performed by: OBSTETRICS & GYNECOLOGY

## 2019-02-26 PROCEDURE — 88331 PATH CONSLTJ SURG 1 BLK 1SPC: CPT | Performed by: PATHOLOGY

## 2019-02-26 PROCEDURE — 71000001 HC PACU PHASE 1 INITIAL 30MIN: Performed by: OBSTETRICS & GYNECOLOGY

## 2019-02-26 PROCEDURE — 25000000 HC PHARMACY GENERAL: Performed by: OBSTETRICS & GYNECOLOGY

## 2019-02-26 PROCEDURE — 25000000 HC PHARMACY GENERAL: Performed by: NURSE ANESTHETIST, CERTIFIED REGISTERED

## 2019-02-26 PROCEDURE — 36000014 HC OR LEVEL 4 EA ADDL MIN: Performed by: OBSTETRICS & GYNECOLOGY

## 2019-02-26 PROCEDURE — 71000002 HC PACU PHASE 2 INITIAL 30MIN: Performed by: OBSTETRICS & GYNECOLOGY

## 2019-02-26 PROCEDURE — 88307 TISSUE EXAM BY PATHOLOGIST: CPT | Performed by: OBSTETRICS & GYNECOLOGY

## 2019-02-26 PROCEDURE — 25800000 HC PHARMACY IV SOLUTIONS: Performed by: NURSE ANESTHETIST, CERTIFIED REGISTERED

## 2019-02-26 PROCEDURE — 63700000 HC SELF-ADMINISTRABLE DRUG: Performed by: PHYSICIAN ASSISTANT

## 2019-02-26 PROCEDURE — 36000004 HC OR LEVEL 4 INITIAL 30MIN: Performed by: OBSTETRICS & GYNECOLOGY

## 2019-02-26 PROCEDURE — 63600000 HC DRUGS/DETAIL CODE: Performed by: ANESTHESIOLOGY

## 2019-02-26 RX ORDER — NEOSTIGMINE METHYLSULFATE 1 MG/ML
INJECTION INTRAVENOUS AS NEEDED
Status: DISCONTINUED | OUTPATIENT
Start: 2019-02-26 | End: 2019-02-26 | Stop reason: SURG

## 2019-02-26 RX ORDER — SODIUM CHLORIDE 9 MG/ML
INJECTION, SOLUTION INTRAVENOUS CONTINUOUS PRN
Status: DISCONTINUED | OUTPATIENT
Start: 2019-02-26 | End: 2019-02-26 | Stop reason: SURG

## 2019-02-26 RX ORDER — CELECOXIB 200 MG/1
400 CAPSULE ORAL ONCE
Status: COMPLETED | OUTPATIENT
Start: 2019-02-26 | End: 2019-02-26

## 2019-02-26 RX ORDER — TRAMADOL HYDROCHLORIDE 50 MG/1
50-100 TABLET ORAL EVERY 6 HOURS PRN
Qty: 12 TABLET | Refills: 0 | Status: SHIPPED | OUTPATIENT
Start: 2019-02-26 | End: 2019-03-03

## 2019-02-26 RX ORDER — OXYCODONE HYDROCHLORIDE 5 MG/1
5-10 TABLET ORAL EVERY 4 HOURS PRN
Status: DISCONTINUED | OUTPATIENT
Start: 2019-02-26 | End: 2019-02-26 | Stop reason: HOSPADM

## 2019-02-26 RX ORDER — MIDAZOLAM HYDROCHLORIDE 2 MG/2ML
INJECTION, SOLUTION INTRAMUSCULAR; INTRAVENOUS AS NEEDED
Status: DISCONTINUED | OUTPATIENT
Start: 2019-02-26 | End: 2019-02-26 | Stop reason: SURG

## 2019-02-26 RX ORDER — MORPHINE SULFATE 2 MG/ML
1-2 INJECTION, SOLUTION INTRAMUSCULAR; INTRAVENOUS
Status: DISCONTINUED | OUTPATIENT
Start: 2019-02-26 | End: 2019-02-26 | Stop reason: HOSPADM

## 2019-02-26 RX ORDER — ONDANSETRON HYDROCHLORIDE 2 MG/ML
INJECTION, SOLUTION INTRAVENOUS AS NEEDED
Status: DISCONTINUED | OUTPATIENT
Start: 2019-02-26 | End: 2019-02-26 | Stop reason: SURG

## 2019-02-26 RX ORDER — IBUPROFEN 200 MG
16-32 TABLET ORAL AS NEEDED
Status: DISCONTINUED | OUTPATIENT
Start: 2019-02-26 | End: 2019-02-26 | Stop reason: HOSPADM

## 2019-02-26 RX ORDER — ONDANSETRON 4 MG/1
4 TABLET, ORALLY DISINTEGRATING ORAL EVERY 8 HOURS PRN
Status: DISCONTINUED | OUTPATIENT
Start: 2019-02-26 | End: 2019-02-26 | Stop reason: HOSPADM

## 2019-02-26 RX ORDER — HYDROMORPHONE HYDROCHLORIDE 2 MG/ML
0.5 INJECTION, SOLUTION INTRAMUSCULAR; INTRAVENOUS; SUBCUTANEOUS
Status: DISCONTINUED | OUTPATIENT
Start: 2019-02-26 | End: 2019-02-26 | Stop reason: HOSPADM

## 2019-02-26 RX ORDER — ROCURONIUM BROMIDE 10 MG/ML
INJECTION, SOLUTION INTRAVENOUS AS NEEDED
Status: DISCONTINUED | OUTPATIENT
Start: 2019-02-26 | End: 2019-02-26 | Stop reason: SURG

## 2019-02-26 RX ORDER — KETOROLAC TROMETHAMINE 30 MG/ML
30 INJECTION, SOLUTION INTRAMUSCULAR; INTRAVENOUS EVERY 6 HOURS PRN
Status: DISCONTINUED | OUTPATIENT
Start: 2019-02-26 | End: 2019-02-26 | Stop reason: HOSPADM

## 2019-02-26 RX ORDER — ONDANSETRON HYDROCHLORIDE 2 MG/ML
4 INJECTION, SOLUTION INTRAVENOUS
Status: DISCONTINUED | OUTPATIENT
Start: 2019-02-26 | End: 2019-02-26 | Stop reason: HOSPADM

## 2019-02-26 RX ORDER — FENTANYL CITRATE 50 UG/ML
INJECTION, SOLUTION INTRAMUSCULAR; INTRAVENOUS AS NEEDED
Status: DISCONTINUED | OUTPATIENT
Start: 2019-02-26 | End: 2019-02-26 | Stop reason: SURG

## 2019-02-26 RX ORDER — DEXAMETHASONE SODIUM PHOSPHATE 4 MG/ML
INJECTION, SOLUTION INTRA-ARTICULAR; INTRALESIONAL; INTRAMUSCULAR; INTRAVENOUS; SOFT TISSUE AS NEEDED
Status: DISCONTINUED | OUTPATIENT
Start: 2019-02-26 | End: 2019-02-26 | Stop reason: SURG

## 2019-02-26 RX ORDER — DEXTROSE 50 % IN WATER (D50W) INTRAVENOUS SYRINGE
25 AS NEEDED
Status: DISCONTINUED | OUTPATIENT
Start: 2019-02-26 | End: 2019-02-26 | Stop reason: HOSPADM

## 2019-02-26 RX ORDER — BUPIVACAINE HYDROCHLORIDE AND EPINEPHRINE 2.5; 5 MG/ML; UG/ML
INJECTION, SOLUTION EPIDURAL; INFILTRATION; INTRACAUDAL; PERINEURAL AS NEEDED
Status: DISCONTINUED | OUTPATIENT
Start: 2019-02-26 | End: 2019-02-26 | Stop reason: HOSPADM

## 2019-02-26 RX ORDER — MEPERIDINE HYDROCHLORIDE 50 MG/ML
12.5 INJECTION INTRAMUSCULAR; INTRAVENOUS; SUBCUTANEOUS EVERY 10 MIN PRN
Status: DISCONTINUED | OUTPATIENT
Start: 2019-02-26 | End: 2019-02-26 | Stop reason: HOSPADM

## 2019-02-26 RX ORDER — SODIUM CHLORIDE, SODIUM GLUCONATE, SODIUM ACETATE, POTASSIUM CHLORIDE AND MAGNESIUM CHLORIDE 30; 37; 368; 526; 502 MG/100ML; MG/100ML; MG/100ML; MG/100ML; MG/100ML
80 INJECTION, SOLUTION INTRAVENOUS CONTINUOUS
Status: DISCONTINUED | OUTPATIENT
Start: 2019-02-26 | End: 2019-02-26 | Stop reason: HOSPADM

## 2019-02-26 RX ORDER — ACETAMINOPHEN 325 MG/1
650 TABLET ORAL EVERY 4 HOURS PRN
Status: DISCONTINUED | OUTPATIENT
Start: 2019-02-26 | End: 2019-02-26 | Stop reason: HOSPADM

## 2019-02-26 RX ORDER — DEXTROSE 40 %
15-30 GEL (GRAM) ORAL AS NEEDED
Status: DISCONTINUED | OUTPATIENT
Start: 2019-02-26 | End: 2019-02-26 | Stop reason: HOSPADM

## 2019-02-26 RX ORDER — LIDOCAINE HYDROCHLORIDE 10 MG/ML
INJECTION, SOLUTION INFILTRATION; PERINEURAL AS NEEDED
Status: DISCONTINUED | OUTPATIENT
Start: 2019-02-26 | End: 2019-02-26 | Stop reason: SURG

## 2019-02-26 RX ORDER — FENTANYL CITRATE 50 UG/ML
50 INJECTION, SOLUTION INTRAMUSCULAR; INTRAVENOUS
Status: DISCONTINUED | OUTPATIENT
Start: 2019-02-26 | End: 2019-02-26 | Stop reason: HOSPADM

## 2019-02-26 RX ORDER — EPHEDRINE SULFATE 50 MG/ML
INJECTION, SOLUTION INTRAVENOUS AS NEEDED
Status: DISCONTINUED | OUTPATIENT
Start: 2019-02-26 | End: 2019-02-26 | Stop reason: SURG

## 2019-02-26 RX ORDER — ACETAMINOPHEN 325 MG/1
975 TABLET ORAL ONCE
Status: COMPLETED | OUTPATIENT
Start: 2019-02-26 | End: 2019-02-26

## 2019-02-26 RX ORDER — PROPOFOL 10 MG/ML
INJECTION, EMULSION INTRAVENOUS AS NEEDED
Status: DISCONTINUED | OUTPATIENT
Start: 2019-02-26 | End: 2019-02-26 | Stop reason: SURG

## 2019-02-26 RX ORDER — GLYCOPYRROLATE 0.6MG/3ML
SYRINGE (ML) INTRAVENOUS AS NEEDED
Status: DISCONTINUED | OUTPATIENT
Start: 2019-02-26 | End: 2019-02-26 | Stop reason: SURG

## 2019-02-26 RX ADMIN — EPHEDRINE SULFATE 10 MG: 50 INJECTION INTRAVENOUS at 10:30

## 2019-02-26 RX ADMIN — ROCURONIUM BROMIDE 5 MG: 10 INJECTION, SOLUTION INTRAVENOUS at 10:20

## 2019-02-26 RX ADMIN — FENTANYL CITRATE 50 MCG: 50 INJECTION, SOLUTION INTRAMUSCULAR; INTRAVENOUS at 10:47

## 2019-02-26 RX ADMIN — Medication 60 MG: at 10:20

## 2019-02-26 RX ADMIN — ONDANSETRON 4 MG: 2 INJECTION INTRAMUSCULAR; INTRAVENOUS at 10:55

## 2019-02-26 RX ADMIN — PROPOFOL 50 MG: 10 INJECTION, EMULSION INTRAVENOUS at 10:50

## 2019-02-26 RX ADMIN — PROPOFOL 200 MG: 10 INJECTION, EMULSION INTRAVENOUS at 10:20

## 2019-02-26 RX ADMIN — Medication 3 MG: at 11:41

## 2019-02-26 RX ADMIN — ROCURONIUM BROMIDE 25 MG: 10 INJECTION, SOLUTION INTRAVENOUS at 10:33

## 2019-02-26 RX ADMIN — EPHEDRINE SULFATE 10 MG: 50 INJECTION INTRAVENOUS at 10:32

## 2019-02-26 RX ADMIN — SODIUM CHLORIDE: 9 INJECTION, SOLUTION INTRAVENOUS at 10:56

## 2019-02-26 RX ADMIN — OXYCODONE HYDROCHLORIDE 5 MG: 5 TABLET ORAL at 14:09

## 2019-02-26 RX ADMIN — CELECOXIB 400 MG: 200 CAPSULE ORAL at 08:58

## 2019-02-26 RX ADMIN — FENTANYL CITRATE 50 MCG: 50 INJECTION, SOLUTION INTRAMUSCULAR; INTRAVENOUS at 11:53

## 2019-02-26 RX ADMIN — FENTANYL CITRATE 100 MCG: 50 INJECTION, SOLUTION INTRAMUSCULAR; INTRAVENOUS at 10:18

## 2019-02-26 RX ADMIN — ACETAMINOPHEN 975 MG: 325 TABLET ORAL at 08:58

## 2019-02-26 RX ADMIN — GLYCOPYRROLATE 0.4 MG: 0.2 INJECTION INTRAMUSCULAR; INTRAVENOUS at 11:41

## 2019-02-26 RX ADMIN — FENTANYL CITRATE 50 MCG: 50 INJECTION, SOLUTION INTRAMUSCULAR; INTRAVENOUS at 12:18

## 2019-02-26 RX ADMIN — LIDOCAINE HYDROCHLORIDE 5 ML: 10 INJECTION, SOLUTION INFILTRATION; PERINEURAL at 10:20

## 2019-02-26 RX ADMIN — KETOROLAC TROMETHAMINE 30 MG: 30 INJECTION, SOLUTION INTRAMUSCULAR at 13:01

## 2019-02-26 RX ADMIN — DEXAMETHASONE SODIUM PHOSPHATE 8 MG: 4 INJECTION, SOLUTION INTRAMUSCULAR; INTRAVENOUS at 10:54

## 2019-02-26 RX ADMIN — MIDAZOLAM HYDROCHLORIDE 2 MG: 1 INJECTION, SOLUTION INTRAMUSCULAR; INTRAVENOUS at 10:14

## 2019-02-26 ASSESSMENT — ENCOUNTER SYMPTOMS
DYSRHYTHMIAS: 0
HEADACHES: 1

## 2019-02-26 ASSESSMENT — PAIN SCALES - GENERAL: PAIN_LEVEL: 0

## 2019-02-26 ASSESSMENT — PAIN - FUNCTIONAL ASSESSMENT: PAIN_FUNCTIONAL_ASSESSMENT: 0-10

## 2019-02-26 ASSESSMENT — PAIN DESCRIPTION - DESCRIPTORS: DESCRIPTORS: ACHING

## 2019-02-26 NOTE — ANESTHESIA POSTPROCEDURE EVALUATION
Patient: Jalil Guerra    Procedure Summary     Date:  02/26/19 Room / Location:  [facility] OR [facility] / [facility] OR    Anesthesia Start:  1013 Anesthesia Stop:  1159    Procedure:  Laparoscopy, right Salpingo-Oophorectomy/left Salpingectomy (N/A ) Diagnosis:       Other ovarian cyst, unspecified side      (OVARIAN CYST )    Surgeon:  Kennedy Montanez MD Responsible Provider:  Maeve Sow MD    Anesthesia Type:  general ASA Status:  2          Anesthesia Type: general  PACU Vitals  2/26/2019 1153 - 2/26/2019 1253      2/26/2019 1200 2/26/2019 1205 2/26/2019 1210 2/26/2019 1215    BP: (!)  143/70 - 127/66 -    Temp: 36.3 °C (97.3 °F) - - -    Pulse: 74 64 66 68    Resp: 16 18 20 (!)  26    SpO2: 100 % 100 % 100 % 100 %              2/26/2019 1220 2/26/2019 1230 2/26/2019 1245       BP: - 116/62 117/62     Temp: - - -     Pulse: 64 62 62     Resp: 13 (!)  11 12     SpO2: 100 % 100 % 100 %             Anesthesia Post Evaluation    Pain score: 0  Pain management: adequate  Patient location during evaluation: PACU  Patient participation: complete - patient participated  Level of consciousness: awake and alert  Cardiovascular status: acceptable  Airway Patency: adequate  Respiratory status: acceptable  Hydration status: acceptable  Anesthetic complications: no

## 2019-02-26 NOTE — OP NOTE
Laparoscopy, right Salpingo-Oophorectomy/left Salpingectomy Procedure Note     Procedure:    Laparoscopy, right Salpingo-Oophorectomy/left Salpingectomy  CPT(R) Code:  06941 - [place] LAP,RMV  ADNEXAL STRUCTURE         Pre-op Diagnosis     * Other ovarian cyst, unspecified side [N83.299].orp    Post-op Diagnosis     * Other ovarian cyst, unspecified side [N83.299]    Surgeon:  Kennedy Montanez MD    Assistants: Sujatha Medina MD; Cassandra Kerr MD.     Anesthesia: General endotracheal anesthesia    ASA Class: 2    Anesthesia:  Dr. Sow     Estimated Blood Loss:  10cc           Specimens:   ID Type Source Tests Collected by Time Destination   1 : RIGHT FALLOPIAN TUBE AND RIGHT OVARY  Tissue Fallopian Tube, Right PATHOLOGY TISSUE EXAM Kennedy Montanez MD 2/26/2019 1131    2 : LEFT FALLOPIAN TUBE Tissue Fallopian Tube, Left PATHOLOGY TISSUE EXAM Kennedy Montanez MD 2/26/2019 1106               Complications:none    Condition: stable    Findings: 6cm right ovarian cyst. Normal appearing right and left fallopian tubes. Normal appearing left ovary. Two small 1-2cm pedunculated uterine fibroids, fundal in location.     Procedure Details   The surgeon and patient were mutually identified in the preoperative area. Her consents were reconfirmed and her questions were again answered. Sequential compression devices were applied to her lower extremities.  She was taken to the operating suite and administered general anesthesia with endotracheal intubation by Dr. Sow. She was then placed in the dorsal  lithotomy position. She was then prepped and draped in the usual sterile fashion and a timeout was performed. A schaeffer catheter was placed and gloves were changed.     The skin at the umbilicus was injected with 0.5% Marcaine. A 12mm incision was made. Entry into the abdomen was performed via Arita open technique. The abdomen was insufflated. Inspection revealed no evidence of entry injury. The upper abdomen appeared  grossly normal.  The ovarian cyst was noted to be originating from the right ovary, contrary to prior imaging. The patient was placed into Trendelenburg position.    5mm ports were placed in each lower quadrant, under direct visualization, after injecting the same local anesthetic. The retroperitoneum was opened lateral to the infundibulopelvic ligaments on the right side. The course of the ureter was identified in the retroperitoneum. The IP ligament was isolated, ligated, and divided.  The utero-ovarian ligament was divided.  All pedicles were hemostatic. An endocatch bag was introduced through the 12mm port and the specimen was placed in the bag. The cyst was ruptured in the bag, without spill, and the fluid was suctioned to decrease the size of the specimen. The abdomen was desufflated and the 12 mm trocar was removed. The endocatch bag and specimen were removed through the 12mm port. The right tube and ovary were sent to pathology for frozen section. The 12mm trocar was reintroduced and insufflation was again obtained. The left tube was then elevated. The tube was dissected away from the ovary and mesosalpinx using the harmonic. The left fallopian tube was sent to pathology for permanent evaluation.  Fascia and peritoneum was closed with 0 Vicryl.  Pneumoperitoneum is reinstituted, the abdomen was again surveyed.    Pneumoperitoneum was relieved, 5 mm ports were removed. Frozen pathology revealed a benign left tube, ovary and cyst. Skin incisions were closed with 4-0 monocryl. Dermabond was applied.     All counts were correct. The patient was awakened and taken to the PACU.      Sujatha Medina MD      I reviewed all pathology with the nurses prior to handoff.  I performed the entire procedure      Kennedy Montanez MD  Phone Number: [phone]

## 2019-02-26 NOTE — BRIEF OP NOTE
Laparoscopy, Left Salpingo-Oophorectomy/Right Salpingectomy Procedure Note    Procedure:    Laparoscopy, Right Salpingo-Oophorectomy/Left Salpingectomy  CPT(R) Code:  76213 - [place] LAP,RMV  ADNEXAL STRUCTURE      Pre-op Diagnosis     * Other ovarian cyst, unspecified side [N83.299]       Post-op Diagnosis     * Other ovarian cyst, unspecified side [N83.299]    Surgeon(s) and Role:     * Kennedy Montanez MD - Primary     * Cassandra Kerr MD - Resident - Assisting     * Suajtha Medina MD - Resident - Assisting    Anesthesia: General    Staff:   Circulator: Gaby Ruiz RN; Gaudencio Carter RN  Scrub Person: Melani Diez    Procedure Details   RSO, LS     Estimated Blood Loss: 10cc    Specimens:                  Order Name Source Comment Collection Info Order Time   PATHOLOGY TISSUE EXAM Fallopian Tube, Right   Pre-op diagnosis:OVARIAN CYST  Collected By: Kennedy Montanez MD 2/26/2019 11:02 AM       Frozen: Right tube and ovary    Permanent: Left tube     Drains:   Urethral Catheter Latex 16 Fr (Active)       Implants: * No implants in log *           Complications:  None; patient tolerated the procedure well.           Disposition: PACU - hemodynamically stable.           Condition: stable    MD Tarik Cosme David O, MD  Phone Number: [phone]

## 2019-02-26 NOTE — OR SURGEON
Pre-Procedure patient identification:  I am the primary operating surgeon/proceduralist and I have identified the patient and confirmed laterality is left on 02/26/19 at 9:32 AM Kennedy Montanez MD  Phone Number: [phone]

## 2019-02-26 NOTE — ANESTHESIA PROCEDURE NOTES
Airway  Urgency: elective    Start Time: 2/26/2019 10:25 AM  Airway not difficult    General Information and Staff    Patient location during procedure: OR  Anesthesiologist: CAMACHO ASHLEY    Indications and Patient Condition  Indications for airway management: anesthesia  Sedation level: general  Preoxygenated: yes  Patient position: sniffing  MILS maintained throughout  Mask difficulty assessment: 0 - not attempted    Final Airway Details  Final airway type: endotracheal airway      Successful airway: ETT  Cuffed: yes   Successful intubation technique: direct laryngoscopy  Facilitating devices/methods: cricoid pressure  Blade: Shawna  Blade size: #3  ETT size: 7.0 mm  Cormack-Lehane Classification: grade I - full view of glottis  Placement verified by: chest auscultation and capnometry   Measured from: lips  ETT to lips (cm): 22  Number of attempts at approach: 1  Ventilation between attempts: none  Number of other approaches attempted: 0  Atraumatic airway insertion      Additional Comments  ETT placed by Dominique COWAN RSI for aspiration risk

## 2019-02-26 NOTE — DISCHARGE INSTRUCTIONS
[facility]  Laparoscopic Surgery  Discharge Instructions      What to Expect    Although your surgery was done laparoscopically, it is still surgery and may take some time to recover. Please resume activities slowly.    It is normal for one incision to be slighter larger and sometimes more painful than the rest.    You can continue the ibuprofen 600 mg every 6 hours and acetaminophen  1000mg every 6 hours that you were taking in the hospital to decrease or prevent pain.  Please be sure to take these medications with food to avoid stomach upset.You may be given a prescription for narcotic pain medicine (usually tramadol or Dilaudid).The narcotic medicine is used for moderate to severe pain. Understand that narcotics cause constipation and can be addictive.     Try to drink plenty of fluids.  We recommend taking one dose of Miralax daily and a  stool softener (Colace/Docusate) while taking narcotic pain medications or if you are prone to constipation.       Activity    Do not lift more than 25lbs.  for at least 2 weeks from the time of surgery.    Climbing stairs is ok. Gradually increase the amount as tolerated.    If you have had a hysterectomy performed no tampons, douching or intercourse until you’re examined and given the approval from the doctor (usually around 6-8 weeks from surgery).    You may resume driving when you are off narcotics for 24 hours and pain-free enough to react quickly with your braking foot.    You may shower normally.  The incisions usually have dissolvable sutures or glue at the incision sites. Please keep clean and dry. Just pat the area dry after a shower. A small amount of redness at the skin edge is normal and a small amount of bloody or clear discharge can be expected.    PLEASE CALL THE OFFICE -[phone] TO SCHEDULE A FOLLOW-UP EXAM IN 2 WEEKS FROM TIME OF SURGERY.    PLEASE CALL THE OFFICE IF YOU EXPERIENCE ANY OF THE FOLLOWING:  - Heavy vaginal bleeding.   This is if you are using about 1 pad per hour.  - Fever greater than 101.0.   - Worsening pain or pain not relieved by pain medications.  - Constipation not relieved by laxatives.  - Persistent nausea or vomiting or any other concerns.

## 2019-02-27 LAB
CASE RPRT: NORMAL
CLINICAL INFO: NORMAL
PATH REPORT.FINAL DX SPEC: NORMAL
PATH REPORT.GROSS SPEC: NORMAL
PATH REPORT.INTRAOP OBS SPEC DOC: NORMAL

## 2019-03-07 PROBLEM — D27.9 SEROUS CYSTADENOMA OF OVARY: Status: ACTIVE | Noted: 2019-02-20

## 2019-03-07 PROBLEM — N83.209 CYST OF OVARY: Status: ACTIVE | Noted: 2019-02-20

## 2019-03-10 PROBLEM — D27.0 BENIGN NEOPLASM OF RIGHT OVARY: Status: ACTIVE | Noted: 2019-02-20

## 2019-03-11 ENCOUNTER — OFFICE VISIT (OUTPATIENT)
Dept: GYNECOLOGIC ONCOLOGY | Facility: CLINIC | Age: 59
End: 2019-03-11
Attending: OBSTETRICS & GYNECOLOGY
Payer: COMMERCIAL

## 2019-03-11 VITALS
BODY MASS INDEX: 23.3 KG/M2 | SYSTOLIC BLOOD PRESSURE: 124 MMHG | WEIGHT: 123.4 LBS | HEART RATE: 66 BPM | HEIGHT: 61 IN | DIASTOLIC BLOOD PRESSURE: 79 MMHG

## 2019-03-11 DIAGNOSIS — D27.0 BENIGN NEOPLASM OF RIGHT OVARY: Primary | ICD-10-CM

## 2019-03-11 PROCEDURE — 99213 OFFICE O/P EST LOW 20 MIN: CPT | Performed by: OBSTETRICS & GYNECOLOGY

## 2019-03-11 ASSESSMENT — PAIN SCALES - GENERAL: PAINLEVEL: 0-NO PAIN

## 2019-03-11 NOTE — PROGRESS NOTES
CC:   Chief Complaint     Post-op          HPI: Ms. Jalil Guerra is [place] [facility] [place] . She reports no issues    Allergies: No known drug allergies    Medications:     Current Outpatient Prescriptions:   •  LINZESS 290 mcg capsule, Take by mouth once daily., Disp: , Rfl: 10  •  spironolactone (ALDACTONE) 100 mg tablet, Take 100 mg by mouth daily., Disp: , Rfl:   •  valACYclovir (VALTREX) 500 mg tablet, Take 250 mg by mouth daily., Disp: , Rfl:     Review of Systems  Normal in all systems    Vital Signs for the last 24 hours:  Heart Rate:  [66] 66  BP: (124)/(79) 124/79    Physical Exam From Office:  General: well-developed, well-nourished, no apparent distress  GI: abdomen soft, non-distended, non-tender, no hepatosplenomegaly, no masses, Incisions CDI  Gynecologic: normal external female genitalia, normal urethra, meatus, bladder   Speculum: normal appearing vagina, surgically absent cervix, cuff healing well    Assessment/Plan     Ms. Jalil Guerra is a 58 y.o. lady post op. She can resume her usual activities. She should return in PRN. We discussed Resuming activities

## 2019-03-11 NOTE — LETTER
March 13, 2019    Patient: Jalil Guerra   YOB: 1960   Date of Visit: 3/11/2019       Dear Dr. Carmichael:    The patient is seen back at the [facility] today in follow up with regard to her   1. Benign neoplasm of right ovary    . Below is my assessment and plan of care.    Ms. Jalil Guerra is a 58 y.o. lady post op. She can resume her usual activities. She should return in PRN. We discussed Resuming activities       Sincerely,      Kennedy Montanez MD  CC: MD Jakob Hilario MD Melissa L Delaney,

## 2019-05-30 ENCOUNTER — RX ONLY (RX ONLY)
Age: 59
End: 2019-05-30

## 2019-05-30 ENCOUNTER — APPOINTMENT (OUTPATIENT)
Dept: URBAN - METROPOLITAN AREA CLINIC 200 | Age: 59
Setting detail: DERMATOLOGY
End: 2019-06-11

## 2019-05-30 DIAGNOSIS — L20.84 INTRINSIC (ALLERGIC) ECZEMA: ICD-10-CM

## 2019-05-30 PROCEDURE — OTHER PRESCRIPTION: OTHER

## 2019-05-30 PROCEDURE — 99212 OFFICE O/P EST SF 10 MIN: CPT

## 2019-05-30 RX ORDER — SPIRONOLACTONE 100 MG/1
TABLET, FILM COATED ORAL
Qty: 60 | Refills: 11 | Status: ERX

## 2019-05-30 RX ORDER — PIMECROLIMUS 10 MG/G
CREAM TOPICAL
Qty: 1 | Refills: 6 | Status: ERX

## 2019-05-30 ASSESSMENT — LOCATION DETAILED DESCRIPTION DERM
LOCATION DETAILED: LEFT SUPERIOR MEDIAL BUCCAL CHEEK
LOCATION DETAILED: RIGHT INFERIOR CENTRAL MALAR CHEEK

## 2019-05-30 ASSESSMENT — LOCATION SIMPLE DESCRIPTION DERM
LOCATION SIMPLE: RIGHT CHEEK
LOCATION SIMPLE: LEFT CHEEK

## 2019-05-30 ASSESSMENT — LOCATION ZONE DERM: LOCATION ZONE: FACE

## 2019-07-11 ENCOUNTER — RX ONLY (RX ONLY)
Age: 59
End: 2019-07-11

## 2019-07-11 RX ORDER — BIMATOPROST 0.3 MG/ML
SOLUTION/ DROPS OPHTHALMIC
Qty: 1 | Refills: 3 | Status: ERX

## 2019-07-11 RX ORDER — ADAPALENE 3 MG/G
GEL TOPICAL
Qty: 1 | Refills: 3 | Status: ERX | COMMUNITY
Start: 2019-07-11

## 2019-08-23 ENCOUNTER — RX ONLY (RX ONLY)
Age: 59
End: 2019-08-23

## 2019-08-23 RX ORDER — EFLORNITHINE HYDROCHLORIDE 139 MG/G
CREAM TOPICAL
Qty: 1 | Refills: 6

## 2019-11-13 ENCOUNTER — RX ONLY (RX ONLY)
Age: 59
End: 2019-11-13

## 2019-11-13 RX ORDER — FINASTERIDE 1 MG/1
TABLET, FILM COATED ORAL
Qty: 15 | Refills: 1 | Status: ERX | COMMUNITY
Start: 2019-11-13

## 2019-12-16 ENCOUNTER — APPOINTMENT (OUTPATIENT)
Dept: URBAN - METROPOLITAN AREA CLINIC 200 | Age: 59
Setting detail: DERMATOLOGY
End: 2019-12-19

## 2019-12-16 DIAGNOSIS — L57.8 OTHER SKIN CHANGES DUE TO CHRONIC EXPOSURE TO NONIONIZING RADIATION: ICD-10-CM

## 2019-12-16 DIAGNOSIS — Z12.83 ENCOUNTER FOR SCREENING FOR MALIGNANT NEOPLASM OF SKIN: ICD-10-CM

## 2019-12-16 DIAGNOSIS — L72.8 OTHER FOLLICULAR CYSTS OF THE SKIN AND SUBCUTANEOUS TISSUE: ICD-10-CM

## 2019-12-16 DIAGNOSIS — L70.0 ACNE VULGARIS: ICD-10-CM

## 2019-12-16 DIAGNOSIS — L21.8 OTHER SEBORRHEIC DERMATITIS: ICD-10-CM

## 2019-12-16 PROBLEM — J30.1 ALLERGIC RHINITIS DUE TO POLLEN: Status: ACTIVE | Noted: 2019-12-16

## 2019-12-16 PROCEDURE — OTHER INTRALESIONAL KENALOG: OTHER

## 2019-12-16 PROCEDURE — 99213 OFFICE O/P EST LOW 20 MIN: CPT | Mod: 25

## 2019-12-16 PROCEDURE — OTHER PRESCRIPTION: OTHER

## 2019-12-16 PROCEDURE — OTHER MIPS QUALITY: OTHER

## 2019-12-16 PROCEDURE — OTHER COUNSELING: OTHER

## 2019-12-16 PROCEDURE — 11900 INJECT SKIN LESIONS </W 7: CPT

## 2019-12-16 PROCEDURE — OTHER REASSURANCE: OTHER

## 2019-12-16 RX ORDER — HALOBETASOL PROPIONATE 0.5 MG/G
AEROSOL, FOAM TOPICAL
Qty: 1 | Refills: 0 | Status: ERX | COMMUNITY
Start: 2019-12-16

## 2019-12-16 RX ORDER — AZELAIC ACID 0.15 G/G
GEL TOPICAL
Qty: 3 | Refills: 6 | Status: ERX | COMMUNITY
Start: 2019-12-16

## 2019-12-16 ASSESSMENT — LOCATION ZONE DERM
LOCATION ZONE: FACE
LOCATION ZONE: TRUNK
LOCATION ZONE: SCALP

## 2019-12-16 ASSESSMENT — LOCATION DETAILED DESCRIPTION DERM
LOCATION DETAILED: HAIR
LOCATION DETAILED: LEFT MEDIAL SUPERIOR CHEST
LOCATION DETAILED: MIDDLE STERNUM
LOCATION DETAILED: RIGHT SUPERIOR UPPER BACK
LOCATION DETAILED: LEFT INFERIOR CENTRAL MALAR CHEEK

## 2019-12-16 ASSESSMENT — LOCATION SIMPLE DESCRIPTION DERM
LOCATION SIMPLE: RIGHT UPPER BACK
LOCATION SIMPLE: CHEST
LOCATION SIMPLE: HAIR
LOCATION SIMPLE: LEFT CHEEK

## 2019-12-16 NOTE — PROCEDURE: MIPS QUALITY
Additional Notes: Patient has not received flu shot, but plans to.
Additional Notes: Shingles SHINGRIX vaccine not received due to it being on back order.
Detail Level: Detailed
Quality 474: Zoster Vaccination Status: Shingrix vaccine was not administered for reasons documented by clinician (e.g. patient administered vaccine other than Shingrix, patient allergy or other medical reasons, patient declined or other patient reasons, vaccine not available or other system reasons)
Quality 110: Preventive Care And Screening: Influenza Immunization: Influenza Immunization not Administered for Documented Reasons.

## 2020-01-15 ENCOUNTER — TRANSCRIBE ORDERS (OUTPATIENT)
Dept: SCHEDULING | Age: 60
End: 2020-01-15

## 2020-01-15 DIAGNOSIS — M81.0 AGE-RELATED OSTEOPOROSIS WITHOUT CURRENT PATHOLOGICAL FRACTURE: ICD-10-CM

## 2020-01-15 DIAGNOSIS — Z12.31 ENCOUNTER FOR SCREENING MAMMOGRAM FOR MALIGNANT NEOPLASM OF BREAST: Primary | ICD-10-CM

## 2020-01-22 ENCOUNTER — HOSPITAL ENCOUNTER (OUTPATIENT)
Dept: RADIOLOGY | Facility: HOSPITAL | Age: 60
Discharge: HOME | End: 2020-01-22
Attending: OBSTETRICS & GYNECOLOGY
Payer: COMMERCIAL

## 2020-01-22 DIAGNOSIS — Z12.31 ENCOUNTER FOR SCREENING MAMMOGRAM FOR MALIGNANT NEOPLASM OF BREAST: ICD-10-CM

## 2020-01-22 DIAGNOSIS — M81.0 AGE-RELATED OSTEOPOROSIS WITHOUT CURRENT PATHOLOGICAL FRACTURE: ICD-10-CM

## 2020-01-22 PROCEDURE — 77067 SCR MAMMO BI INCL CAD: CPT

## 2020-01-22 PROCEDURE — 77080 DXA BONE DENSITY AXIAL: CPT

## 2020-01-29 ENCOUNTER — RX ONLY (RX ONLY)
Age: 60
End: 2020-01-29

## 2020-01-29 RX ORDER — BIMATOPROST 0.3 MG/ML
SOLUTION/ DROPS OPHTHALMIC
Qty: 1 | Refills: 3 | Status: ERX | COMMUNITY
Start: 2020-01-29

## 2020-03-11 ENCOUNTER — APPOINTMENT (OUTPATIENT)
Dept: URBAN - METROPOLITAN AREA CLINIC 200 | Age: 60
Setting detail: DERMATOLOGY
End: 2020-03-13

## 2020-03-11 DIAGNOSIS — L72.0 EPIDERMAL CYST: ICD-10-CM

## 2020-03-11 DIAGNOSIS — L70.0 ACNE VULGARIS: ICD-10-CM

## 2020-03-11 DIAGNOSIS — L91.8 OTHER HYPERTROPHIC DISORDERS OF THE SKIN: ICD-10-CM

## 2020-03-11 PROCEDURE — OTHER PRESCRIPTION: OTHER

## 2020-03-11 PROCEDURE — OTHER INCISION AND DRAINAGE: OTHER

## 2020-03-11 PROCEDURE — 99213 OFFICE O/P EST LOW 20 MIN: CPT | Mod: 25

## 2020-03-11 PROCEDURE — 10060 I&D ABSCESS SIMPLE/SINGLE: CPT

## 2020-03-11 PROCEDURE — OTHER REASSURANCE: OTHER

## 2020-03-11 RX ORDER — DAPSONE 50 MG/G
GEL TOPICAL
Qty: 1 | Refills: 10 | Status: ERX | COMMUNITY
Start: 2020-03-11

## 2020-03-11 RX ORDER — SODIUM SULFACETAMIDE AND SULFUR 80; 40 MG/ML; MG/ML
LOTION TOPICAL
Qty: 1 | Refills: 10 | Status: ERX | COMMUNITY
Start: 2020-03-11

## 2020-03-11 ASSESSMENT — LOCATION SIMPLE DESCRIPTION DERM
LOCATION SIMPLE: ABDOMEN
LOCATION SIMPLE: RIGHT CHEEK
LOCATION SIMPLE: LEFT CHEEK
LOCATION SIMPLE: RIGHT CLAVICULAR SKIN

## 2020-03-11 ASSESSMENT — LOCATION DETAILED DESCRIPTION DERM
LOCATION DETAILED: RIGHT SUPERIOR MEDIAL BUCCAL CHEEK
LOCATION DETAILED: LEFT INFERIOR CENTRAL MALAR CHEEK
LOCATION DETAILED: RIGHT CLAVICULAR SKIN
LOCATION DETAILED: LEFT CENTRAL BUCCAL CHEEK
LOCATION DETAILED: RIGHT RIB CAGE

## 2020-03-11 ASSESSMENT — LOCATION ZONE DERM
LOCATION ZONE: FACE
LOCATION ZONE: TRUNK

## 2020-03-11 NOTE — PROCEDURE: INCISION AND DRAINAGE
Dressing: pressure dressing
Curette: No
Anesthesia Volume In Cc: 0
Drainage Amount?: moderate
Detail Level: Detailed
Consent was obtained and risks were reviewed including but not limited to delayed wound healing, infection, need for multiple I and D's, and pain.
Post-Care Instructions: I reviewed with the patient in detail post-care instructions. Patient should keep wound covered and call the office should any redness, pain, swelling or worsening occur.
Epidermal Closure: simple interrupted
Anesthesia Type: 1% lidocaine with 1:300,000 epinephrine
Preparation Text: The area was prepped in the usual clean fashion.
Lesion Type: Cyst
Epidermal Sutures: 5-0 Nylon
Drainage Type?: cyst-like
Method: 15 blade
Suture Text: The incision was partially closed with

## 2020-03-13 ENCOUNTER — RX ONLY (RX ONLY)
Age: 60
End: 2020-03-13

## 2020-03-13 RX ORDER — HALOBETASOL PROPIONATE 0.5 MG/G
AEROSOL, FOAM TOPICAL
Qty: 1 | Refills: 6 | Status: ERX

## 2020-06-23 ENCOUNTER — RX ONLY (RX ONLY)
Age: 60
End: 2020-06-23

## 2020-06-23 RX ORDER — TRETIONIN 0.5 MG/G
CREAM TOPICAL
Qty: 1 | Refills: 7 | Status: ERX | COMMUNITY
Start: 2020-06-23

## 2020-07-22 NOTE — ANESTHESIA PREPROCEDURE EVALUATION
1900 - 8 secs of PAT HR up to 140 on review - pt may have been ambulating to BR with PCT at this time but has had no complaints.   Anesthesia ROS/MED HX    Anesthesia History    Previous anesthetics  No history of anesthetic complications  Pulmonary - neg  Neuro/Psych    Headaches (eccedrin PRN)  Cardiovascular  no dyslipidemia  no hypertension  No dysrhythmias  No history of MI   ECG reviewed   Normal ECG  Hematological - neg  GI/Hepatic   GERD    Control: poorly controlled  Endo/Other  No diabetes  No hyperthyroidism  No hypothyroidism  ROS/MED HX Comments:    ECG: Sinus thom   GI/Hepatic/Renal: Constipation    Endoscopy yesteday-- found gastritis-- no new meds. Pepcid/zantac before meals.   Endo: Hx pubovaginal sling    Ovarian cyst, severe bloating/abd distension, and weight gain over the past 6 months    Renal Disease: Urinary incontinence.      Past Surgical History:   Procedure Laterality Date   • ANKLE SURGERY Right     Reconstruction   • CHOLECYSTECTOMY     • PUBOVAGINAL SLING         Physical Exam    Airway   Mallampati: II   TM distance: >3 FB   Neck ROM: full  Cardiovascular - normal   Rhythm: regular   Rate: normal  Pulmonary - normal   clear to auscultation  Dental - normal        Anesthesia Plan    Plan: general    Technique: general endotracheal     Lines and Monitors: additional IV     Airway: oral intubation   ASA 2  Blood Products:     Use of Blood Products Discussed: Yes     Consented to blood products  Anesthetic plan and risks discussed with: patient  Postop Plan:    Trial extubation   Patient Disposition: phase II then home   Pain Management: IV analgesics  Comments:    Plan: Left ovary and falopian tube    Plan is GA with ETT. Unclear precipitating GERD symptoms. Will do RSI to avoid aspiration.

## 2020-07-29 ENCOUNTER — APPOINTMENT (OUTPATIENT)
Dept: URBAN - METROPOLITAN AREA CLINIC 200 | Age: 60
Setting detail: DERMATOLOGY
End: 2020-08-04

## 2020-07-29 ENCOUNTER — RX ONLY (RX ONLY)
Age: 60
End: 2020-07-29

## 2020-07-29 DIAGNOSIS — B07.8 OTHER VIRAL WARTS: ICD-10-CM

## 2020-07-29 DIAGNOSIS — L70.0 ACNE VULGARIS: ICD-10-CM

## 2020-07-29 PROBLEM — J30.1 ALLERGIC RHINITIS DUE TO POLLEN: Status: ACTIVE | Noted: 2020-07-29

## 2020-07-29 PROCEDURE — 99212 OFFICE O/P EST SF 10 MIN: CPT | Mod: 25

## 2020-07-29 PROCEDURE — 17110 DESTRUCT B9 LESION 1-14: CPT

## 2020-07-29 PROCEDURE — OTHER PRESCRIPTION MEDICATION MANAGEMENT: OTHER

## 2020-07-29 PROCEDURE — OTHER PRESCRIPTION: OTHER

## 2020-07-29 PROCEDURE — OTHER LIQUID NITROGEN: OTHER

## 2020-07-29 RX ORDER — SULFAMETHOXAZOLE AND TRIMETHOPRIM 400; 80 MG/1; MG/1
TABLET ORAL
Qty: 60 | Refills: 6 | Status: ERX | COMMUNITY
Start: 2020-07-29

## 2020-07-29 RX ORDER — AMPICILLIN 250 MG/1
CAPSULE ORAL
Qty: 30 | Refills: 4 | Status: ERX | COMMUNITY
Start: 2020-07-29

## 2020-07-29 RX ORDER — MINOCYCLINE 40 MG/G
AEROSOL, FOAM TOPICAL
Qty: 1 | Refills: 4 | Status: ERX | COMMUNITY
Start: 2020-07-29

## 2020-07-29 ASSESSMENT — LOCATION DETAILED DESCRIPTION DERM
LOCATION DETAILED: LEFT INFERIOR CENTRAL MALAR CHEEK
LOCATION DETAILED: LEFT CHIN
LOCATION DETAILED: LEFT ANTERIOR PROXIMAL THIGH
LOCATION DETAILED: RIGHT INFERIOR CENTRAL MALAR CHEEK

## 2020-07-29 ASSESSMENT — LOCATION SIMPLE DESCRIPTION DERM
LOCATION SIMPLE: LEFT THIGH
LOCATION SIMPLE: CHIN
LOCATION SIMPLE: LEFT CHEEK
LOCATION SIMPLE: RIGHT CHEEK

## 2020-07-29 ASSESSMENT — LOCATION ZONE DERM
LOCATION ZONE: LEG
LOCATION ZONE: FACE

## 2020-07-29 NOTE — PROCEDURE: PRESCRIPTION MEDICATION MANAGEMENT
Render In Strict Bullet Format?: No
Plan: T/C topical spironolactone.
Detail Level: Detailed
Modify Regimen: Increase spironolactone
Continue Regimen: Spironolactone  increase to two pills.

## 2020-07-29 NOTE — PROCEDURE: LIQUID NITROGEN
Medical Necessity Information: It is in your best interest to select a reason for this procedure from the list below. All of these items fulfill various CMS LCD requirements except the new and changing color options.
Post-Care Instructions: I reviewed with the patient in detail post-care instructions. Patient is to wear sunprotection, and avoid picking at any of the treated lesions. Pt may apply Vaseline to crusted or scabbing areas.
Detail Level: Detailed
Medical Necessity Clause: This procedure was medically necessary because the lesions that were treated were: causing pain
Render Post-Care Instructions In Note?: no
Consent: The patient's consent was obtained including but not limited to risks of crusting, scabbing, blistering, scarring, darker or lighter pigmentary change, recurrence, incomplete removal and infection.
Number Of Freeze-Thaw Cycles: 2 freeze-thaw cycles
Include Z78.9 (Other Specified Conditions Influencing Health Status) As An Associated Diagnosis?: Yes

## 2020-08-03 ENCOUNTER — RX ONLY (RX ONLY)
Age: 60
End: 2020-08-03

## 2020-08-03 RX ORDER — AMPICILLIN 500 MG/1
CAPSULE ORAL
Qty: 60 | Refills: 4 | Status: ERX | COMMUNITY
Start: 2020-08-03

## 2020-10-21 ENCOUNTER — RX ONLY (RX ONLY)
Age: 60
End: 2020-10-21

## 2020-10-21 ENCOUNTER — APPOINTMENT (OUTPATIENT)
Dept: URBAN - METROPOLITAN AREA CLINIC 200 | Age: 60
Setting detail: DERMATOLOGY
End: 2020-11-01

## 2020-10-21 DIAGNOSIS — L70.0 ACNE VULGARIS: ICD-10-CM

## 2020-10-21 PROCEDURE — 11900 INJECT SKIN LESIONS </W 7: CPT

## 2020-10-21 PROCEDURE — OTHER PRESCRIPTION MEDICATION MANAGEMENT: OTHER

## 2020-10-21 PROCEDURE — OTHER INTRALESIONAL KENALOG: OTHER

## 2020-10-21 RX ORDER — AMPICILLIN 500 MG/1
CAPSULE ORAL
Qty: 60 | Refills: 4 | Status: ERX | COMMUNITY
Start: 2020-10-21

## 2020-10-21 ASSESSMENT — LOCATION DETAILED DESCRIPTION DERM
LOCATION DETAILED: LEFT CENTRAL MALAR CHEEK
LOCATION DETAILED: LEFT SUPERIOR MEDIAL FOREHEAD
LOCATION DETAILED: RIGHT INFERIOR MEDIAL MALAR CHEEK

## 2020-10-21 ASSESSMENT — LOCATION SIMPLE DESCRIPTION DERM
LOCATION SIMPLE: LEFT CHEEK
LOCATION SIMPLE: RIGHT CHEEK
LOCATION SIMPLE: LEFT FOREHEAD

## 2020-10-21 ASSESSMENT — LOCATION ZONE DERM: LOCATION ZONE: FACE

## 2020-10-21 NOTE — PROCEDURE: PRESCRIPTION MEDICATION MANAGEMENT
Render In Strict Bullet Format?: No
Continue Regimen: Spironolactone\\nFinasteride\\nMinocycline foam
Detail Level: Detailed

## 2020-12-02 ENCOUNTER — APPOINTMENT (OUTPATIENT)
Dept: URBAN - METROPOLITAN AREA CLINIC 200 | Age: 60
Setting detail: DERMATOLOGY
End: 2020-12-15

## 2020-12-02 DIAGNOSIS — L70.0 ACNE VULGARIS: ICD-10-CM

## 2020-12-02 DIAGNOSIS — E28.2 POLYCYSTIC OVARIAN SYNDROME: ICD-10-CM

## 2020-12-02 PROCEDURE — OTHER ORDER TESTS: OTHER

## 2020-12-02 PROCEDURE — OTHER REASSURANCE: OTHER

## 2020-12-02 PROCEDURE — OTHER MIPS QUALITY: OTHER

## 2020-12-02 PROCEDURE — 99213 OFFICE O/P EST LOW 20 MIN: CPT

## 2020-12-02 PROCEDURE — OTHER PRESCRIPTION: OTHER

## 2020-12-02 PROCEDURE — OTHER PRESCRIPTION MEDICATION MANAGEMENT: OTHER

## 2020-12-02 RX ORDER — ADAPALENE 3 MG/G
GEL TOPICAL HS
Qty: 1 | Refills: 6 | COMMUNITY
Start: 2020-12-02

## 2020-12-02 RX ORDER — FINASTERIDE 5 MG/1
TABLET, FILM COATED ORAL
Qty: 30 | Refills: 5 | COMMUNITY
Start: 2020-12-02

## 2020-12-02 ASSESSMENT — LOCATION DETAILED DESCRIPTION DERM
LOCATION DETAILED: LEFT SUPERIOR CENTRAL MALAR CHEEK
LOCATION DETAILED: RIGHT CENTRAL MALAR CHEEK

## 2020-12-02 ASSESSMENT — LOCATION ZONE DERM: LOCATION ZONE: FACE

## 2020-12-02 ASSESSMENT — LOCATION SIMPLE DESCRIPTION DERM
LOCATION SIMPLE: RIGHT CHEEK
LOCATION SIMPLE: LEFT CHEEK

## 2020-12-02 NOTE — PROCEDURE: PRESCRIPTION MEDICATION MANAGEMENT
Render In Strict Bullet Format?: No
Detail Level: Detailed
Initiate Treatment: Finasteride 5 mg - take half pill daily. Can gradually increase to one.
Continue Regimen: Ampicillian
Discontinue Regimen: Finasteride 1mg.
Modify Regimen: Discontinue ampicillian after patient receives derm abrasion if face is clear and ampicillian is not needed.

## 2021-01-28 ENCOUNTER — TRANSCRIBE ORDERS (OUTPATIENT)
Dept: SCHEDULING | Age: 61
End: 2021-01-28

## 2021-01-28 DIAGNOSIS — Z12.31 ENCOUNTER FOR SCREENING MAMMOGRAM FOR MALIGNANT NEOPLASM OF BREAST: Primary | ICD-10-CM

## 2021-01-30 ENCOUNTER — HOSPITAL ENCOUNTER (OUTPATIENT)
Dept: RADIOLOGY | Facility: HOSPITAL | Age: 61
Discharge: HOME | End: 2021-01-30
Attending: OBSTETRICS & GYNECOLOGY
Payer: COMMERCIAL

## 2021-01-30 DIAGNOSIS — Z12.31 ENCOUNTER FOR SCREENING MAMMOGRAM FOR MALIGNANT NEOPLASM OF BREAST: ICD-10-CM

## 2021-01-30 PROCEDURE — 77063 BREAST TOMOSYNTHESIS BI: CPT

## 2022-02-01 ENCOUNTER — TRANSCRIBE ORDERS (OUTPATIENT)
Dept: SCHEDULING | Age: 62
End: 2022-02-01

## 2022-02-01 DIAGNOSIS — Z12.31 ENCOUNTER FOR SCREENING MAMMOGRAM FOR MALIGNANT NEOPLASM OF BREAST: Primary | ICD-10-CM

## 2022-02-01 DIAGNOSIS — Z78.0 ASYMPTOMATIC MENOPAUSAL STATE: ICD-10-CM

## 2022-03-08 ENCOUNTER — HOSPITAL ENCOUNTER (OUTPATIENT)
Dept: RADIOLOGY | Facility: HOSPITAL | Age: 62
Discharge: HOME | End: 2022-03-08
Attending: OBSTETRICS & GYNECOLOGY
Payer: COMMERCIAL

## 2022-03-08 DIAGNOSIS — Z12.31 ENCOUNTER FOR SCREENING MAMMOGRAM FOR MALIGNANT NEOPLASM OF BREAST: ICD-10-CM

## 2022-03-08 DIAGNOSIS — Z78.0 ASYMPTOMATIC MENOPAUSAL STATE: ICD-10-CM

## 2022-03-08 PROCEDURE — 77067 SCR MAMMO BI INCL CAD: CPT

## 2022-03-08 PROCEDURE — 77080 DXA BONE DENSITY AXIAL: CPT

## 2022-03-31 ENCOUNTER — APPOINTMENT (OUTPATIENT)
Dept: URBAN - METROPOLITAN AREA CLINIC 200 | Age: 62
Setting detail: DERMATOLOGY
End: 2022-04-04

## 2022-03-31 DIAGNOSIS — L72.0 EPIDERMAL CYST: ICD-10-CM

## 2022-03-31 DIAGNOSIS — L92.8 OTHER GRANULOMATOUS DISORDERS OF THE SKIN AND SUBCUTANEOUS TISSUE: ICD-10-CM

## 2022-03-31 DIAGNOSIS — Z11.52 ENCOUNTER FOR SCREENING FOR COVID-19: ICD-10-CM

## 2022-03-31 PROBLEM — J30.1 ALLERGIC RHINITIS DUE TO POLLEN: Status: ACTIVE | Noted: 2022-03-31

## 2022-03-31 PROBLEM — L98.8 OTHER SPECIFIED DISORDERS OF THE SKIN AND SUBCUTANEOUS TISSUE: Status: ACTIVE | Noted: 2022-03-31

## 2022-03-31 PROCEDURE — OTHER BIOPSY BY PUNCH METHOD: OTHER

## 2022-03-31 PROCEDURE — OTHER PRESCRIPTION MEDICATION MANAGEMENT: OTHER

## 2022-03-31 PROCEDURE — OTHER SCREENING FOR COVID-19: OTHER

## 2022-03-31 PROCEDURE — 11104 PUNCH BX SKIN SINGLE LESION: CPT

## 2022-03-31 PROCEDURE — OTHER MIPS QUALITY: OTHER

## 2022-03-31 PROCEDURE — 99213 OFFICE O/P EST LOW 20 MIN: CPT | Mod: 25

## 2022-03-31 ASSESSMENT — LOCATION SIMPLE DESCRIPTION DERM
LOCATION SIMPLE: RIGHT THUMBNAIL
LOCATION SIMPLE: LEFT BUTTOCK
LOCATION SIMPLE: ABDOMEN

## 2022-03-31 ASSESSMENT — LOCATION DETAILED DESCRIPTION DERM
LOCATION DETAILED: PERIUMBILICAL SKIN
LOCATION DETAILED: RIGHT THUMBNAIL
LOCATION DETAILED: LEFT BUTTOCK

## 2022-03-31 ASSESSMENT — LOCATION ZONE DERM
LOCATION ZONE: TRUNK
LOCATION ZONE: FINGERNAIL

## 2022-03-31 NOTE — PROCEDURE: PRESCRIPTION MEDICATION MANAGEMENT
Render In Strict Bullet Format?: No
Plan: Recommended Dr. Addi Bob for 2nd opinion for nail
Detail Level: Detailed

## 2022-03-31 NOTE — PROCEDURE: BIOPSY BY PUNCH METHOD
Detail Level: Detailed
Billing Type: Third-Party Bill
Consent: Written consent was obtained and risks were reviewed including but not limited to scarring, infection, bleeding, scabbing, incomplete removal, nerve damage and allergy to anesthesia.
Wound Care: Aquaphor
Biopsy Type: H and E
Validate Triangulation: No
Suture Removal: 10 days
Anesthesia Type: 1% lidocaine without epinephrine
Information: Selecting Yes will display possible errors in your note based on the variables you have selected. This validation is only offered as a suggestion for you. PLEASE NOTE THAT THE VALIDATION TEXT WILL BE REMOVED WHEN YOU FINALIZE YOUR NOTE. IF YOU WANT TO FAX A PRELIMINARY NOTE YOU WILL NEED TO TOGGLE THIS TO 'NO' IF YOU DO NOT WANT IT IN YOUR FAXED NOTE.
Was A Bandage Applied: Yes
Dressing: pressure dressing
Additional Anesthesia Volume In Cc (Will Not Render If 0): 0
Epidermal Sutures: 4-0 Nylon
Hemostasis: Electrocautery
Anesthesia Volume In Cc (Will Not Render If 0): 1
Notification Instructions: Patient will be notified of biopsy results. However, patient instructed to call the office if not contacted within 2 weeks.
Punch Size In Mm: 8
Home Suture Removal Text: Patient was provided a home suture removal kit and will remove their sutures at home.  If they have any questions or difficulties they will call the office.
Post-Care Instructions: I reviewed with the patient in detail post-care instructions. Patient is to keep the biopsy site dry overnight, and then apply bacitracin twice daily until healed. Patient may apply hydrogen peroxide soaks to remove any crusting.

## 2022-04-01 ENCOUNTER — RX ONLY (RX ONLY)
Age: 62
End: 2022-04-01

## 2022-04-01 RX ORDER — FLUOROURACIL 5 MG/G
CREAM TOPICAL
Qty: 30 | Refills: 6 | Status: ERX | COMMUNITY
Start: 2022-04-01

## 2022-04-01 RX ORDER — TRETIONIN 0.25 MG/G
CREAM TOPICAL
Qty: 20 | Refills: 6 | Status: ERX | COMMUNITY
Start: 2022-04-01

## 2022-04-01 RX ORDER — SODIUM SULFACETAMIDE AND SULFUR 80; 40 MG/ML; MG/ML
LOTION TOPICAL
Qty: 473 | Refills: 10 | Status: ERX | COMMUNITY
Start: 2022-04-01

## 2022-04-01 RX ORDER — FINASTERIDE 1 MG/1
TABLET, FILM COATED ORAL
Qty: 15 | Refills: 1 | Status: ERX

## 2022-04-01 RX ORDER — ADAPALENE 3 MG/G
GEL TOPICAL
Qty: 45 | Refills: 4 | Status: ERX | COMMUNITY
Start: 2022-04-01

## 2022-04-01 RX ORDER — BIMATOPROST 0.3 MG/ML
SOLUTION/ DROPS OPHTHALMIC
Qty: 3 | Refills: 3 | Status: ERX

## 2022-04-01 RX ORDER — BIMATOPROST 0.3 MG/ML
SOLUTION/ DROPS OPHTHALMIC
Qty: 3 | Refills: 3 | Status: ERX | COMMUNITY
Start: 2022-04-01

## 2022-04-01 RX ORDER — FLUOROURACIL 5 MG/G
CREAM TOPICAL
Qty: 30 | Refills: 3 | Status: ERX | COMMUNITY
Start: 2022-04-01

## 2022-04-01 RX ORDER — AZELAIC ACID 0.15 G/G
GEL TOPICAL
Qty: 50 | Refills: 5 | Status: ERX | COMMUNITY
Start: 2022-04-01

## 2022-04-01 RX ORDER — ADAPALENE 3 MG/G
GEL TOPICAL
Qty: 45 | Refills: 4 | Status: ERX

## 2022-04-01 RX ORDER — ALUMINUM CHLORIDE 20 %
SOLUTION, NON-ORAL TOPICAL
Qty: 37.5 | Refills: 6 | Status: ERX | COMMUNITY
Start: 2022-04-01

## 2022-04-01 RX ORDER — CICLOPIROX 80 MG/ML
SOLUTION TOPICAL
Qty: 6.6 | Refills: 5 | Status: ERX | COMMUNITY
Start: 2022-04-01

## 2022-04-01 RX ORDER — ACYCLOVIR 50 MG/G
OINTMENT TOPICAL
Qty: 30 | Refills: 4 | Status: ERX | COMMUNITY
Start: 2022-04-01

## 2022-04-04 ENCOUNTER — APPOINTMENT (EMERGENCY)
Dept: RADIOLOGY | Facility: HOSPITAL | Age: 62
End: 2022-04-04
Payer: COMMERCIAL

## 2022-04-04 ENCOUNTER — HOSPITAL ENCOUNTER (EMERGENCY)
Facility: HOSPITAL | Age: 62
Discharge: HOME | End: 2022-04-04
Attending: EMERGENCY MEDICINE
Payer: COMMERCIAL

## 2022-04-04 VITALS
WEIGHT: 117 LBS | TEMPERATURE: 98.4 F | SYSTOLIC BLOOD PRESSURE: 143 MMHG | RESPIRATION RATE: 18 BRPM | OXYGEN SATURATION: 96 % | DIASTOLIC BLOOD PRESSURE: 69 MMHG | BODY MASS INDEX: 22.09 KG/M2 | HEART RATE: 56 BPM | HEIGHT: 61 IN

## 2022-04-04 DIAGNOSIS — K59.00 CONSTIPATION, UNSPECIFIED CONSTIPATION TYPE: ICD-10-CM

## 2022-04-04 DIAGNOSIS — R10.84 ABDOMINAL PAIN, GENERALIZED: Primary | ICD-10-CM

## 2022-04-04 LAB
ALBUMIN SERPL-MCNC: 4.1 G/DL (ref 3.4–5)
ALP SERPL-CCNC: 57 IU/L (ref 35–126)
ALT SERPL-CCNC: 42 IU/L (ref 11–54)
ANION GAP SERPL CALC-SCNC: 8 MEQ/L (ref 3–15)
AST SERPL-CCNC: 32 IU/L (ref 15–41)
BASOPHILS # BLD: 0.04 K/UL (ref 0.01–0.1)
BASOPHILS NFR BLD: 0.4 %
BILIRUB SERPL-MCNC: 0.7 MG/DL (ref 0.3–1.2)
BUN SERPL-MCNC: 20 MG/DL (ref 8–20)
CALCIUM SERPL-MCNC: 9.3 MG/DL (ref 8.9–10.3)
CHLORIDE SERPL-SCNC: 106 MEQ/L (ref 98–109)
CO2 SERPL-SCNC: 24 MEQ/L (ref 22–32)
CREAT SERPL-MCNC: 0.8 MG/DL (ref 0.6–1.1)
DIFFERENTIAL METHOD BLD: ABNORMAL
EOSINOPHIL # BLD: 0.15 K/UL (ref 0.04–0.36)
EOSINOPHIL NFR BLD: 1.4 %
ERYTHROCYTE [DISTWIDTH] IN BLOOD BY AUTOMATED COUNT: 13.3 % (ref 11.7–14.4)
GFR SERPL CREATININE-BSD FRML MDRD: >60 ML/MIN/1.73M*2
GLUCOSE SERPL-MCNC: 100 MG/DL (ref 70–99)
HCT VFR BLDCO AUTO: 45.7 % (ref 35–45)
HGB BLD-MCNC: 14.7 G/DL (ref 11.8–15.7)
IMM GRANULOCYTES # BLD AUTO: 0.05 K/UL (ref 0–0.08)
IMM GRANULOCYTES NFR BLD AUTO: 0.5 %
LIPASE SERPL-CCNC: 37 U/L (ref 20–51)
LYMPHOCYTES # BLD: 2.58 K/UL (ref 1.2–3.5)
LYMPHOCYTES NFR BLD: 24.2 %
MCH RBC QN AUTO: 30.6 PG (ref 28–33.2)
MCHC RBC AUTO-ENTMCNC: 32.2 G/DL (ref 32.2–35.5)
MCV RBC AUTO: 95.2 FL (ref 83–98)
MONOCYTES # BLD: 0.81 K/UL (ref 0.28–0.8)
MONOCYTES NFR BLD: 7.6 %
NEUTROPHILS # BLD: 7.03 K/UL (ref 1.7–7)
NEUTS SEG NFR BLD: 65.9 %
NRBC BLD-RTO: 0 %
PDW BLD AUTO: 10.8 FL (ref 9.4–12.3)
PLATELET # BLD AUTO: 275 K/UL (ref 150–369)
POTASSIUM SERPL-SCNC: 3.7 MEQ/L (ref 3.6–5.1)
PROT SERPL-MCNC: 7.1 G/DL (ref 6–8.2)
RBC # BLD AUTO: 4.8 M/UL (ref 3.93–5.22)
SODIUM SERPL-SCNC: 138 MEQ/L (ref 136–144)
WBC # BLD AUTO: 10.66 K/UL (ref 3.8–10.5)

## 2022-04-04 PROCEDURE — 80053 COMPREHEN METABOLIC PANEL: CPT

## 2022-04-04 PROCEDURE — 25800000 HC PHARMACY IV SOLUTIONS: Performed by: PHYSICIAN ASSISTANT

## 2022-04-04 PROCEDURE — 3E0337Z INTRODUCTION OF ELECTROLYTIC AND WATER BALANCE SUBSTANCE INTO PERIPHERAL VEIN, PERCUTANEOUS APPROACH: ICD-10-PCS | Performed by: EMERGENCY MEDICINE

## 2022-04-04 PROCEDURE — 96374 THER/PROPH/DIAG INJ IV PUSH: CPT

## 2022-04-04 PROCEDURE — 83690 ASSAY OF LIPASE: CPT | Performed by: EMERGENCY MEDICINE

## 2022-04-04 PROCEDURE — 63600000 HC DRUGS/DETAIL CODE: Performed by: PHYSICIAN ASSISTANT

## 2022-04-04 PROCEDURE — 63600105 HC IODINE BASED CONTRAST: Performed by: PHYSICIAN ASSISTANT

## 2022-04-04 PROCEDURE — 3E033GC INTRODUCTION OF OTHER THERAPEUTIC SUBSTANCE INTO PERIPHERAL VEIN, PERCUTANEOUS APPROACH: ICD-10-PCS | Performed by: EMERGENCY MEDICINE

## 2022-04-04 PROCEDURE — 85025 COMPLETE CBC W/AUTO DIFF WBC: CPT | Performed by: EMERGENCY MEDICINE

## 2022-04-04 PROCEDURE — 93005 ELECTROCARDIOGRAM TRACING: CPT

## 2022-04-04 PROCEDURE — 96361 HYDRATE IV INFUSION ADD-ON: CPT

## 2022-04-04 PROCEDURE — 36415 COLL VENOUS BLD VENIPUNCTURE: CPT

## 2022-04-04 PROCEDURE — 3E033NZ INTRODUCTION OF ANALGESICS, HYPNOTICS, SEDATIVES INTO PERIPHERAL VEIN, PERCUTANEOUS APPROACH: ICD-10-PCS | Performed by: EMERGENCY MEDICINE

## 2022-04-04 PROCEDURE — 99284 EMERGENCY DEPT VISIT MOD MDM: CPT | Mod: 25

## 2022-04-04 PROCEDURE — 93005 ELECTROCARDIOGRAM TRACING: CPT | Performed by: EMERGENCY MEDICINE

## 2022-04-04 PROCEDURE — 80053 COMPREHEN METABOLIC PANEL: CPT | Performed by: EMERGENCY MEDICINE

## 2022-04-04 PROCEDURE — 83690 ASSAY OF LIPASE: CPT

## 2022-04-04 PROCEDURE — 85025 COMPLETE CBC W/AUTO DIFF WBC: CPT

## 2022-04-04 PROCEDURE — 96375 TX/PRO/DX INJ NEW DRUG ADDON: CPT

## 2022-04-04 PROCEDURE — 74177 CT ABD & PELVIS W/CONTRAST: CPT | Mod: ME

## 2022-04-04 RX ORDER — MORPHINE SULFATE 2 MG/ML
2 INJECTION, SOLUTION INTRAMUSCULAR; INTRAVENOUS ONCE AS NEEDED
Status: DISCONTINUED | OUTPATIENT
Start: 2022-04-04 | End: 2022-04-04 | Stop reason: HOSPADM

## 2022-04-04 RX ORDER — ONDANSETRON 4 MG/1
4 TABLET, ORALLY DISINTEGRATING ORAL EVERY 8 HOURS PRN
Qty: 12 TABLET | Refills: 0 | Status: SHIPPED | OUTPATIENT
Start: 2022-04-04 | End: 2023-06-05 | Stop reason: ALTCHOICE

## 2022-04-04 RX ORDER — MORPHINE SULFATE 2 MG/ML
2 INJECTION, SOLUTION INTRAMUSCULAR; INTRAVENOUS ONCE
Status: COMPLETED | OUTPATIENT
Start: 2022-04-04 | End: 2022-04-04

## 2022-04-04 RX ORDER — ONDANSETRON HYDROCHLORIDE 2 MG/ML
4 INJECTION, SOLUTION INTRAVENOUS ONCE
Status: COMPLETED | OUTPATIENT
Start: 2022-04-04 | End: 2022-04-04

## 2022-04-04 RX ORDER — ONDANSETRON HYDROCHLORIDE 2 MG/ML
4 INJECTION, SOLUTION INTRAVENOUS ONCE AS NEEDED
Status: DISCONTINUED | OUTPATIENT
Start: 2022-04-04 | End: 2022-04-04 | Stop reason: HOSPADM

## 2022-04-04 RX ADMIN — ONDANSETRON HYDROCHLORIDE 4 MG: 2 INJECTION, SOLUTION INTRAMUSCULAR; INTRAVENOUS at 15:12

## 2022-04-04 RX ADMIN — MORPHINE SULFATE 2 MG: 2 INJECTION, SOLUTION INTRAMUSCULAR; INTRAVENOUS at 15:13

## 2022-04-04 RX ADMIN — SODIUM CHLORIDE 1000 ML: 9 INJECTION, SOLUTION INTRAVENOUS at 14:55

## 2022-04-04 RX ADMIN — IOHEXOL 125 ML: 350 INJECTION, SOLUTION INTRAVENOUS at 15:07

## 2022-04-04 ASSESSMENT — ENCOUNTER SYMPTOMS
SEIZURES: 0
CHILLS: 0
PALPITATIONS: 0
BACK PAIN: 0
VOMITING: 0
SHORTNESS OF BREATH: 0
COLOR CHANGE: 0
FEVER: 0
ARTHRALGIAS: 0
EYE PAIN: 0
NAUSEA: 1
DYSURIA: 0
SORE THROAT: 0
ABDOMINAL PAIN: 1
HEMATURIA: 0
CONSTIPATION: 1
COUGH: 0

## 2022-04-04 NOTE — DISCHARGE INSTRUCTIONS
Please call your primary care doctor and your gastroenterologist today to inform them of your ER visit today and arrange a follow up appointment within 2 days. Please return immediately to the emergency department for any new/worsening or concerning symptoms such as increased pain, fevers, increased vomiting or diarrhea, unable to eat or drink, unable to urinate, or any worsening of symptoms. Follow up with your healthcare provider for re-evaluation.    You have been provided with a copy of your labs and ct scan impressions. Bring these with you to your follow up appointment with your doctor for further evaluation and testing as needed. Please discontinue Motrin/Aleve/Advil (NSAIDs) products for at least the next week.  If you must take motrin, please eat food prior. You may take Maalox, Mylanta or Tums as needed for the upper abdominal discomfort. You may also try Pepcid.    Drink plenty of fluids  Take stool softeners  Take Miralax, 17 g (~1 heaping tablespoon) dissolved in 120 to 240 mL (4 to 8 ounces) of beverage, once daily; do not use for >1 to 2 weeks for up to 34 g daily.  Mix one teaspoon of olive oil in a cup of coffee or tea  Attempt Ducolax or you may attempt an enema.

## 2022-04-04 NOTE — ED PROVIDER NOTES
Emergency Medicine Note  HPI   HISTORY OF PRESENT ILLNESS     HPI     60 y/o female with history of laparoscopic right salpingoophrectomy and left salpingectomy, laparoscopic cholecystectomy, and IBS (Followed by [facility]) who presents for abdominal pain, nausea, and constipation. She notes she has not had a bowel movement for 10 days, although she has been passing flatus. No relief with Linzess. She is afraid to try ducolax as she became dehydrated and passed out using ducolax in January 2022. She notes moderate epigastric and diffuse abdominal pain started 4 days ago and radiates into her bilateral lower quads has no alleviating factors and is exacerbated with palpation. She admits to nausea without vomiting. Patient has still been eating and drinking since pain started, but her last PO was last night. Denies fever/chills, chest pain, sob, back pain, rash, vomiting, diarrhea, urinary symptoms, black or tarry stools, syncope, any other symptoms. Denies any recent travel or abx use.      Patient History   PAST HISTORY     Reviewed from Nursing Triage:         Past Medical History:   Diagnosis Date   • Acne    • Constipation    • Migraine    • Osteoporosis        Past Surgical History:   Procedure Laterality Date   • ANKLE SURGERY Right     Reconstruction   • CHOLECYSTECTOMY     • PUBOVAGINAL SLING         Family History   Problem Relation Age of Onset   • Diverticulitis Biological Mother    • Breast cancer Neg Hx    • Ovarian cancer Neg Hx    • Colon cancer Neg Hx        Social History     Tobacco Use   • Smoking status: Never Smoker   • Smokeless tobacco: Never Used   Substance Use Topics   • Alcohol use: No   • Drug use: No         Review of Systems   REVIEW OF SYSTEMS     Review of Systems   Constitutional: Negative for chills and fever.   HENT: Negative for ear pain and sore throat.    Eyes: Negative for pain and visual disturbance.   Respiratory: Negative for cough and shortness of breath.     Cardiovascular: Negative for chest pain and palpitations.   Gastrointestinal: Positive for abdominal pain, constipation and nausea. Negative for vomiting.   Genitourinary: Negative for dysuria and hematuria.   Musculoskeletal: Negative for arthralgias and back pain.   Skin: Negative for color change and rash.   Neurological: Negative for seizures and syncope.   All other systems reviewed and are negative.        VITALS     ED Vitals    None                      Physical Exam   PHYSICAL EXAM     Physical Exam  Vitals and nursing note reviewed.   Constitutional:       General: She is not in acute distress.     Appearance: She is well-developed.   HENT:      Head: Normocephalic and atraumatic.   Eyes:      Conjunctiva/sclera: Conjunctivae normal.   Cardiovascular:      Rate and Rhythm: Normal rate and regular rhythm.      Heart sounds: No murmur heard.  Pulmonary:      Effort: Pulmonary effort is normal. No respiratory distress.      Breath sounds: Normal breath sounds.   Abdominal:      Palpations: Abdomen is soft.      Tenderness: There is generalized abdominal tenderness and tenderness in the right lower quadrant and epigastric area. There is no right CVA tenderness or left CVA tenderness.   Musculoskeletal:      Cervical back: Neck supple.   Skin:     General: Skin is warm and dry.   Neurological:      Mental Status: She is alert.           PROCEDURES     Procedures     DATA     Results     None          Imaging Results    None         No orders to display       Scoring tools                                 ED Course & MDM   MDM / ED COURSE / CLINICAL IMPRESSIONS / DISPO     [facility]    ED Course as of 04/05/22 2150   Mon Apr 04, 2022   1430 Impression: diffuse abdominal pain; nausea; epigastric pain; no BM since 3/26; still eating and drinking; no vomiting; hx lap nurys and left salpinoophrectomy   Plan: labs, zofran, fluids, npo, ct abd/pelvis with IV contrast, pain control, reeval    Discussed work up and plan with  attedning who is in agreement [RH]   1450 WBC(!): 10.66  Mildly elevated [RH]   1543 CT ABDOMEN PELVIS WITH IV CONTRAST  IMPRESSION: Redundant stool-filled colon.  Interval resection of a 6.7 cm left adnexal cyst. [RH]   Tue Apr 05, 2022   1630 Patient declined rectal exam for possible disimpaction. 62 yo non-pregnant woman (postmenopausal) presenting with abdominal pain. Considered causes of female-specific abdominal pain unrelated to pregnancy (e.g., pelvic inflammatory disease with or without tubo-ovarian abscess, Crhb-Oyzg-Kunrtj, etc.). Also considered causes of abdominal pain that are not gender-specific (e.g., appendicitis, volvulus, small bowel obstruction, mesenteric adenitis, acute cholecystitis/choledocholithiasis and other biliary pathology, etc.). Patient well-appearing with normal vital signs. Laboratory testing and imaging here reviewed and no emergent findings. Patient given strict return precautions for worsening pain, inability to eat/drink, fevers (temperature over 100.4F), or other concerns. Patient instructed to follow up with their primary doctor and is agreeable; all questions were answered. [RH]      ED Course User Index  [RH] Judy Yanes PA C         Clinical Impressions as of 04/05/22 2150   Abdominal pain, generalized   Constipation, unspecified constipation type              Judy Yanes PA C  04/05/22 2150

## 2022-04-04 NOTE — ED ATTESTATION NOTE
Patient was seen by the PA/NP.  Agree with note and management.       Ashish Longoria MD  04/04/22 6585

## 2022-04-05 LAB
ATRIAL RATE: 64
P AXIS: 41
PR INTERVAL: 148
QRS DURATION: 82
QT INTERVAL: 418
QTC CALCULATION(BAZETT): 431
R AXIS: 42
T WAVE AXIS: 24
VENTRICULAR RATE: 64

## 2022-04-05 PROCEDURE — 93010 ELECTROCARDIOGRAM REPORT: CPT | Performed by: INTERNAL MEDICINE

## 2022-04-06 ENCOUNTER — RX ONLY (RX ONLY)
Age: 62
End: 2022-04-06

## 2022-04-06 RX ORDER — FLUOROURACIL 5 MG/G
CREAM TOPICAL
Qty: 40 | Refills: 0 | Status: ERX | COMMUNITY
Start: 2022-04-06

## 2022-04-15 ENCOUNTER — APPOINTMENT (OUTPATIENT)
Dept: URBAN - METROPOLITAN AREA CLINIC 200 | Age: 62
Setting detail: DERMATOLOGY
End: 2022-04-19

## 2022-04-15 DIAGNOSIS — L72.0 EPIDERMAL CYST: ICD-10-CM

## 2022-04-15 DIAGNOSIS — Z11.52 ENCOUNTER FOR SCREENING FOR COVID-19: ICD-10-CM

## 2022-04-15 PROCEDURE — OTHER SCREENING FOR COVID-19: OTHER

## 2022-04-15 PROCEDURE — OTHER MIPS QUALITY: OTHER

## 2022-04-15 PROCEDURE — OTHER SUTURE REMOVAL (GLOBAL PERIOD): OTHER

## 2022-04-15 PROCEDURE — 99024 POSTOP FOLLOW-UP VISIT: CPT

## 2022-04-15 ASSESSMENT — LOCATION SIMPLE DESCRIPTION DERM
LOCATION SIMPLE: LEFT BUTTOCK
LOCATION SIMPLE: ABDOMEN

## 2022-04-15 ASSESSMENT — LOCATION DETAILED DESCRIPTION DERM
LOCATION DETAILED: PERIUMBILICAL SKIN
LOCATION DETAILED: LEFT BUTTOCK

## 2022-04-15 ASSESSMENT — LOCATION ZONE DERM: LOCATION ZONE: TRUNK

## 2022-04-15 NOTE — PROCEDURE: SUTURE REMOVAL (GLOBAL PERIOD)
Add 46601 Cpt? (Important Note: In 2017 The Use Of 95961 Is Being Tracked By Cms To Determine Future Global Period Reimbursement For Global Periods): yes
Detail Level: Detailed

## 2022-05-09 ENCOUNTER — RX ONLY (RX ONLY)
Age: 62
End: 2022-05-09

## 2022-05-09 RX ORDER — FINASTERIDE 1 MG/1
TABLET, FILM COATED ORAL
Qty: 30 | Refills: 1 | Status: ERX

## 2022-06-09 ENCOUNTER — RX ONLY (RX ONLY)
Age: 62
End: 2022-06-09

## 2022-06-09 RX ORDER — SPIRONOLACTONE 100 MG/1
TABLET, FILM COATED ORAL BID
Qty: 60 | Refills: 12 | Status: ERX | COMMUNITY
Start: 2022-06-09

## 2022-08-23 ENCOUNTER — RX ONLY (RX ONLY)
Age: 62
End: 2022-08-23

## 2022-08-23 RX ORDER — FINASTERIDE 1 MG/1
TABLET, FILM COATED ORAL
Qty: 30 | Refills: 3 | Status: ERX | COMMUNITY
Start: 2022-08-23

## 2022-10-28 ENCOUNTER — RX ONLY (RX ONLY)
Age: 62
End: 2022-10-28

## 2022-10-28 RX ORDER — SPIRONOLACTONE 100 MG/1
TABLET, FILM COATED ORAL BID
Qty: 60 | Refills: 12 | Status: ERX | COMMUNITY
Start: 2022-10-28

## 2022-11-23 ENCOUNTER — RX ONLY (RX ONLY)
Age: 62
End: 2022-11-23

## 2022-11-23 RX ORDER — SPIRONOLACTONE 100 MG/1
TABLET, FILM COATED ORAL
Qty: 180 | Refills: 5 | Status: ERX | COMMUNITY
Start: 2022-11-23

## 2023-02-07 ENCOUNTER — TRANSCRIBE ORDERS (OUTPATIENT)
Dept: SCHEDULING | Age: 63
End: 2023-02-07

## 2023-02-07 DIAGNOSIS — Z12.31 ENCOUNTER FOR SCREENING MAMMOGRAM FOR MALIGNANT NEOPLASM OF BREAST: ICD-10-CM

## 2023-02-07 DIAGNOSIS — R14.0 ABDOMINAL DISTENSION (GASEOUS): Primary | ICD-10-CM

## 2023-02-15 ENCOUNTER — HOSPITAL ENCOUNTER (OUTPATIENT)
Dept: RADIOLOGY | Facility: HOSPITAL | Age: 63
Discharge: HOME | End: 2023-02-15
Attending: GENERAL PRACTICE
Payer: COMMERCIAL

## 2023-02-15 DIAGNOSIS — Z12.31 ENCOUNTER FOR SCREENING MAMMOGRAM FOR MALIGNANT NEOPLASM OF BREAST: ICD-10-CM

## 2023-02-15 DIAGNOSIS — R14.0 ABDOMINAL DISTENSION (GASEOUS): ICD-10-CM

## 2023-02-15 PROCEDURE — 76856 US EXAM PELVIC COMPLETE: CPT | Mod: 59

## 2023-03-10 ENCOUNTER — TRANSCRIBE ORDERS (OUTPATIENT)
Dept: RADIOLOGY | Facility: HOSPITAL | Age: 63
End: 2023-03-10

## 2023-03-10 ENCOUNTER — HOSPITAL ENCOUNTER (OUTPATIENT)
Dept: RADIOLOGY | Facility: HOSPITAL | Age: 63
Discharge: HOME | End: 2023-03-10
Attending: GENERAL PRACTICE
Payer: COMMERCIAL

## 2023-03-10 DIAGNOSIS — Z12.31 ENCOUNTER FOR SCREENING MAMMOGRAM FOR MALIGNANT NEOPLASM OF BREAST: ICD-10-CM

## 2023-03-10 DIAGNOSIS — Z12.31 ENCOUNTER FOR SCREENING MAMMOGRAM FOR MALIGNANT NEOPLASM OF BREAST: Primary | ICD-10-CM

## 2023-03-10 PROCEDURE — 77063 BREAST TOMOSYNTHESIS BI: CPT

## 2023-03-20 ENCOUNTER — APPOINTMENT (OUTPATIENT)
Dept: URBAN - METROPOLITAN AREA CLINIC 203 | Age: 63
Setting detail: DERMATOLOGY
End: 2023-03-23

## 2023-03-20 DIAGNOSIS — L70.0 ACNE VULGARIS: ICD-10-CM

## 2023-03-20 DIAGNOSIS — L60.8 OTHER NAIL DISORDERS: ICD-10-CM

## 2023-03-20 DIAGNOSIS — L73.8 OTHER SPECIFIED FOLLICULAR DISORDERS: ICD-10-CM

## 2023-03-20 DIAGNOSIS — L71.8 OTHER ROSACEA: ICD-10-CM

## 2023-03-20 PROCEDURE — OTHER COUNSELING: OTHER

## 2023-03-20 PROCEDURE — OTHER DEFER: OTHER

## 2023-03-20 PROCEDURE — 99213 OFFICE O/P EST LOW 20 MIN: CPT | Mod: 25

## 2023-03-20 PROCEDURE — 11900 INJECT SKIN LESIONS </W 7: CPT

## 2023-03-20 PROCEDURE — OTHER PRESCRIPTION: OTHER

## 2023-03-20 PROCEDURE — OTHER PRESCRIPTION MEDICATION MANAGEMENT: OTHER

## 2023-03-20 PROCEDURE — OTHER INTRALESIONAL KENALOG: OTHER

## 2023-03-20 RX ORDER — AZELAIC ACID 0.15 G/G
GEL TOPICAL QHS
Qty: 50 | Refills: 3 | Status: ERX | COMMUNITY
Start: 2023-03-20

## 2023-03-20 RX ORDER — CIPROFLOXACIN 500 MG/1
TABLET, FILM COATED ORAL BID
Qty: 56 | Refills: 0 | Status: ERX | COMMUNITY
Start: 2023-03-20

## 2023-03-20 ASSESSMENT — LOCATION SIMPLE DESCRIPTION DERM
LOCATION SIMPLE: LEFT CHEEK
LOCATION SIMPLE: LEFT THUMBNAIL
LOCATION SIMPLE: RIGHT THUMBNAIL
LOCATION SIMPLE: RIGHT CHEEK
LOCATION SIMPLE: NOSE

## 2023-03-20 ASSESSMENT — LOCATION DETAILED DESCRIPTION DERM
LOCATION DETAILED: NASAL DORSUM
LOCATION DETAILED: RIGHT INFERIOR MEDIAL MALAR CHEEK
LOCATION DETAILED: RIGHT THUMBNAIL
LOCATION DETAILED: RIGHT CENTRAL MALAR CHEEK
LOCATION DETAILED: RIGHT INFERIOR CENTRAL MALAR CHEEK
LOCATION DETAILED: LEFT CENTRAL MALAR CHEEK
LOCATION DETAILED: LEFT INFERIOR CENTRAL MALAR CHEEK
LOCATION DETAILED: LEFT THUMBNAIL

## 2023-03-20 ASSESSMENT — LOCATION ZONE DERM
LOCATION ZONE: NOSE
LOCATION ZONE: FINGERNAIL
LOCATION ZONE: FACE

## 2023-03-20 NOTE — PROCEDURE: PRESCRIPTION MEDICATION MANAGEMENT
Plan: Recommend Dr. Ashton at [place] ( recommended him at visit back of march 2022 - PT never followed up with that ) Plan: Recommend Dr. Ashton at [place] ( recommended him at visit back of march 2022 - PT never followed up with that )

## 2023-03-20 NOTE — PROCEDURE: INTRALESIONAL KENALOG
How Many Mls Were Removed From The 40 Mg/Ml (10ml) Vial When Preparing The Injectable Solution?: 0
Medical Necessity Clause: This procedure was medically necessary because the lesions that were treated were:
Total Volume Injected (Ccs- Only Use Numbers And Decimals): .5
Detail Level: Detailed
Include Z78.9 (Other Specified Conditions Influencing Health Status) As An Associated Diagnosis?: No
Validate Note Data When Using Inventory: Yes
Concentration Of Solution Injected (Mg/Ml): 2.5
Consent: The risks of atrophy were reviewed with the patient.
Treatment Number (Optional): 1
Show Inventory Tab: Hide
Kenalog Preparation: Kenalog
Administered By (Optional): ELIER

## 2023-03-20 NOTE — PROCEDURE: DEFER
X Size Of Lesion In Cm (Optional): 0
Introduction Text (Please End With A Colon): The following procedure was deferred:
Procedure To Be Performed At Next Visit: Cautery
Detail Level: Detailed

## 2023-03-20 NOTE — PROCEDURE: PRESCRIPTION MEDICATION MANAGEMENT
Continue Regimen: Current regimen - terbinifine , ketoconazole cream given by [facility] dermatologist Continue Regimen: Current regimen - terbinifine , ketoconazole cream given by [facility] dermatologist

## 2023-03-20 NOTE — PROCEDURE: PRESCRIPTION MEDICATION MANAGEMENT
Initiate Treatment: White vinegar soaks once a day\\nAquaphor , nail lacquer with gloves at nightime\\n\\nciprofloxacin 500 mg tablet BID\\nQuantity: 56.0 Tablet\\nSig: Take one pill two times a day for 4 weeks

## 2023-04-02 ENCOUNTER — RX ONLY (RX ONLY)
Age: 63
End: 2023-04-02

## 2023-04-02 RX ORDER — SPIRONOLACTONE 100 MG/1
TABLET, FILM COATED ORAL
Qty: 180 | Refills: 6 | Status: ERX | COMMUNITY
Start: 2023-04-01

## 2023-05-22 ENCOUNTER — APPOINTMENT (OUTPATIENT)
Dept: URBAN - METROPOLITAN AREA CLINIC 203 | Age: 63
Setting detail: DERMATOLOGY
End: 2023-05-29

## 2023-05-22 DIAGNOSIS — D22 MELANOCYTIC NEVI: ICD-10-CM

## 2023-05-22 DIAGNOSIS — L81.4 OTHER MELANIN HYPERPIGMENTATION: ICD-10-CM

## 2023-05-22 DIAGNOSIS — L57.8 OTHER SKIN CHANGES DUE TO CHRONIC EXPOSURE TO NONIONIZING RADIATION: ICD-10-CM

## 2023-05-22 DIAGNOSIS — Z71.89 OTHER SPECIFIED COUNSELING: ICD-10-CM

## 2023-05-22 DIAGNOSIS — L71.8 OTHER ROSACEA: ICD-10-CM

## 2023-05-22 DIAGNOSIS — L60.8 OTHER NAIL DISORDERS: ICD-10-CM

## 2023-05-22 PROBLEM — D22.5 MELANOCYTIC NEVI OF TRUNK: Status: ACTIVE | Noted: 2023-05-22

## 2023-05-22 PROBLEM — D23.5 OTHER BENIGN NEOPLASM OF SKIN OF TRUNK: Status: ACTIVE | Noted: 2023-05-22

## 2023-05-22 PROCEDURE — OTHER COUNSELING: OTHER

## 2023-05-22 PROCEDURE — 99214 OFFICE O/P EST MOD 30 MIN: CPT

## 2023-05-22 PROCEDURE — OTHER REASSURANCE: OTHER

## 2023-05-22 PROCEDURE — OTHER SUNSCREEN RECOMMENDATIONS: OTHER

## 2023-05-22 PROCEDURE — OTHER PRESCRIPTION MEDICATION MANAGEMENT: OTHER

## 2023-05-22 ASSESSMENT — LOCATION DETAILED DESCRIPTION DERM
LOCATION DETAILED: LEFT INFERIOR CENTRAL MALAR CHEEK
LOCATION DETAILED: LEFT MEDIAL SUPERIOR CHEST
LOCATION DETAILED: RIGHT MEDIAL FOREHEAD
LOCATION DETAILED: LEFT MEDIAL BREAST 11-12:00 REGION
LOCATION DETAILED: LEFT THUMBNAIL
LOCATION DETAILED: LEFT MEDIAL BREAST 10-11:00 REGION
LOCATION DETAILED: RIGHT THUMBNAIL
LOCATION DETAILED: LEFT MID-UPPER BACK

## 2023-05-22 ASSESSMENT — LOCATION SIMPLE DESCRIPTION DERM
LOCATION SIMPLE: LEFT CHEEK
LOCATION SIMPLE: CHEST
LOCATION SIMPLE: LEFT BREAST
LOCATION SIMPLE: RIGHT FOREHEAD
LOCATION SIMPLE: LEFT UPPER BACK
LOCATION SIMPLE: RIGHT THUMBNAIL
LOCATION SIMPLE: LEFT THUMBNAIL

## 2023-05-22 ASSESSMENT — LOCATION ZONE DERM
LOCATION ZONE: TRUNK
LOCATION ZONE: FACE
LOCATION ZONE: FINGERNAIL

## 2023-05-22 NOTE — PROCEDURE: PRESCRIPTION MEDICATION MANAGEMENT
Render In Strict Bullet Format?: No
Detail Level: Zone
Plan: Pt is getting ipl
Plan: Pt is receiving oral lamisil from another provider \\n\\nFinished cipro 500

## 2023-05-31 ENCOUNTER — RX ONLY (RX ONLY)
Age: 63
End: 2023-05-31

## 2023-05-31 RX ORDER — BIMATOPROST 0.3 MG/ML
SOLUTION/ DROPS OPHTHALMIC
Qty: 3 | Refills: 3 | Status: ERX | COMMUNITY
Start: 2023-05-31

## 2023-05-31 RX ORDER — CICLOPIROX 80 MG/ML
SOLUTION TOPICAL
Qty: 6.6 | Refills: 5 | Status: ERX | COMMUNITY
Start: 2023-05-31

## 2023-06-05 ENCOUNTER — APPOINTMENT (EMERGENCY)
Dept: RADIOLOGY | Facility: HOSPITAL | Age: 63
End: 2023-06-05
Payer: COMMERCIAL

## 2023-06-05 ENCOUNTER — HOSPITAL ENCOUNTER (EMERGENCY)
Facility: HOSPITAL | Age: 63
Discharge: HOME | End: 2023-06-05
Attending: EMERGENCY MEDICINE
Payer: COMMERCIAL

## 2023-06-05 VITALS
DIASTOLIC BLOOD PRESSURE: 86 MMHG | HEART RATE: 62 BPM | TEMPERATURE: 98.8 F | SYSTOLIC BLOOD PRESSURE: 143 MMHG | OXYGEN SATURATION: 99 %

## 2023-06-05 DIAGNOSIS — R07.81 RIB PAIN ON RIGHT SIDE: Primary | ICD-10-CM

## 2023-06-05 PROCEDURE — 71101 X-RAY EXAM UNILAT RIBS/CHEST: CPT | Mod: RT

## 2023-06-05 PROCEDURE — 99283 EMERGENCY DEPT VISIT LOW MDM: CPT | Mod: 25

## 2023-06-05 RX ORDER — TERBINAFINE HYDROCHLORIDE 250 MG/1
TABLET ORAL
COMMUNITY
Start: 2023-04-21

## 2023-06-05 ASSESSMENT — ENCOUNTER SYMPTOMS
SHORTNESS OF BREATH: 0
ARTHRALGIAS: 0
HEADACHES: 0
CONFUSION: 0
FLANK PAIN: 1

## 2023-06-05 NOTE — DISCHARGE INSTRUCTIONS
Please call your primary care doctor today to inform them of your ER visit today and arrange a follow up appointment within 2 days. Please return immediately to the emergency department for any new/worsening or concerning symptoms. You have been provided with copies of your imaging study impressions. Bring these with you to your follow up appointments with your doctors for further evaluation and testing as needed.

## 2023-06-05 NOTE — ED ATTESTATION NOTE
The patient was evaluated and managed by the PA/NP/resident.  I have discussed the case with the PA/NP/resident.  I am in agreement with the ED workup and treatment plan.  I will continue to be available for consultation as needed.     Godshall, Duane K, MD  06/05/23 1040

## 2023-06-05 NOTE — ED PROVIDER NOTES
"Emergency Medicine Note  HPI   HISTORY OF PRESENT ILLNESS   Patient is a 62-year-old female with PMHx of osteoporosis who presents to ED for right sided rib pain onset yesterday 06/04. Patient states right sided rib pain associated with a mechanical fall from her Pilates reformer machine and describes \"hearing a crack\" onset yesterday 06/04. Denies head trauma, other injuries. No LOC. No blood thinner use. She denies pain radiation and states pain exacerbated by deep inspiration. Denies pain improvement with Naproxen, last dose taken this morning 06/05. Currently, patient denies any other complaints.          History provided by:  Patient   used: No          Patient History   PAST HISTORY     Reviewed from Nursing Triage: Tobacco  Allergies  Meds  Problems  Med Hx  Surg Hx  Fam Hx  Soc   Hx           Past Medical History:   Diagnosis Date   • Acne    • Constipation    • Migraine    • Osteoporosis        Past Surgical History:   Procedure Laterality Date   • ANKLE SURGERY Right     Reconstruction   • CHOLECYSTECTOMY     • PUBOVAGINAL SLING         Family History   Problem Relation Age of Onset   • Diverticulitis Biological Mother    • Breast cancer Neg Hx    • Ovarian cancer Neg Hx    • Colon cancer Neg Hx          Review of Systems    REVIEW OF SYSTEMS   Review of Systems   Respiratory: Negative for shortness of breath.    Cardiovascular: Negative for chest pain.   Genitourinary: Positive for flank pain (right sided).   Musculoskeletal: Negative for arthralgias and gait problem.             Neurological: Negative for syncope and headaches.   Psychiatric/Behavioral: Negative for confusion.         VITALS   ED Vitals    Date/Time Temp Pulse Resp BP SpO2 [facility]   06/05/23 0935 37.1 °C (98.8 °F) 62 -- 143/86 99 % HC                         Physical Exam   PHYSICAL EXAM   Physical Exam  Vitals and nursing note reviewed.   Constitutional:       General: She is awake. She is not in acute distress.   "   Appearance: Normal appearance. She is well-developed, well-groomed and normal weight. She is not ill-appearing, toxic-appearing or diaphoretic.   HENT:      Head: Normocephalic and atraumatic.   Cardiovascular:      Rate and Rhythm: Normal rate and regular rhythm.      Pulses: Normal pulses.   Pulmonary:      Effort: Pulmonary effort is normal. No respiratory distress.   Musculoskeletal:      Comments: Mild tenderness on right posterior ribs   Skin:     General: Skin is warm and dry.      Capillary Refill: Capillary refill takes less than 2 seconds.      Findings: No bruising, ecchymosis or signs of injury.   Neurological:      Mental Status: She is alert.   Psychiatric:         Behavior: Behavior is cooperative.           PROCEDURES   Procedures     DATA   Results     None          Imaging Results          X-RAY RIBS RIGHT WITH PA CHEST (Final result)  Result time 06/05/23 10:16:00    Final result                 Impression:    IMPRESSION:  Normal right ribs.    COMMENT: Three-view examination of the right ribs demonstrates no fracture or  acute bony abnormality. Erect PA view of the chest shows no pneumothorax. The  heart, pulmonary vasculature, mediastinum and kitty are normal. There is no  infiltrate or effusion.                 Narrative:    CLINICAL HISTORY: Fall on exercise machine. Right chest wall pain                                No orders to display       Scoring tools                              ED Course & MDM   MDM / ED COURSE / CLINICAL IMPRESSION / DISPO     Medical Decision Making  Likely rib contusion.  Instructed patient to take over-the-counter pain medication use lidocaine patches.  She will follow-up with her PCP.  I also instructed her in how to use incentive spirometry.  Patient instructed to come back to the ED for any worsening symptoms. Extensive education provided on signs and symptoms that would warrant a return visit to the emergency department along with emphasis on importance of  follow-up.  Patient verbalized understanding and agreeable.  All questions answered.    Rib pain on right side: undiagnosed new problem with uncertain prognosis  Amount and/or Complexity of Data Reviewed  Radiology: ordered. Decision-making details documented in ED Course.          ED Course as of 06/05/23 1138   Mon Jun 05, 2023   1034 X-RAY RIBS RIGHT WITH PA CHEST [RH]   1043 Impression : Right sided flank pain     Plan: X-ray and re-evaluate  [AS]      ED Course User Index  [AS] Rayray Escoto  [RH] Judy Yanes PA C     Clinical Impression      Rib pain on right side        By signing my name below, Rayray LLANES, attest that this documentation has been prepared under the direction and in the presence of Judy Yanes PA-C.     Judy LLANES PA-C, personally performed the services described in this documentation, as documented by the scribe in my presence, and it is both accurate and complete.       Rayray Escoto  06/05/23 1053       Judy Yanes PA C  06/05/23 1138

## 2023-12-22 ENCOUNTER — APPOINTMENT (OUTPATIENT)
Dept: URBAN - METROPOLITAN AREA CLINIC 203 | Age: 63
Setting detail: DERMATOLOGY
End: 2023-12-27

## 2023-12-22 DIAGNOSIS — L64.8 OTHER ANDROGENIC ALOPECIA: ICD-10-CM

## 2023-12-22 DIAGNOSIS — L71.8 OTHER ROSACEA: ICD-10-CM

## 2023-12-22 DIAGNOSIS — R52 PAIN, UNSPECIFIED: ICD-10-CM

## 2023-12-22 DIAGNOSIS — L03.01 CELLULITIS OF FINGER: ICD-10-CM

## 2023-12-22 PROBLEM — L03.012 CELLULITIS OF LEFT FINGER: Status: ACTIVE | Noted: 2023-12-22

## 2023-12-22 PROBLEM — D23.4 OTHER BENIGN NEOPLASM OF SKIN OF SCALP AND NECK: Status: ACTIVE | Noted: 2023-12-22

## 2023-12-22 PROBLEM — D23.39 OTHER BENIGN NEOPLASM OF SKIN OF OTHER PARTS OF FACE: Status: ACTIVE | Noted: 2023-12-22

## 2023-12-22 PROBLEM — L03.011 CELLULITIS OF RIGHT FINGER: Status: ACTIVE | Noted: 2023-12-22

## 2023-12-22 PROCEDURE — 99214 OFFICE O/P EST MOD 30 MIN: CPT

## 2023-12-22 PROCEDURE — OTHER ADDITIONAL NOTES: OTHER

## 2023-12-22 PROCEDURE — OTHER PRESCRIPTION: OTHER

## 2023-12-22 PROCEDURE — OTHER RECOMMENDATIONS: OTHER

## 2023-12-22 PROCEDURE — OTHER REASSURANCE: OTHER

## 2023-12-22 PROCEDURE — OTHER PRESCRIPTION MEDICATION MANAGEMENT: OTHER

## 2023-12-22 PROCEDURE — OTHER DEFER: OTHER

## 2023-12-22 RX ORDER — NYSTATIN AND TRIAMCINOLONE ACETONIDE 100000; 1 [USP'U]/G; MG/G
OINTMENT TOPICAL BID
Qty: 60 | Refills: 3 | Status: ERX | COMMUNITY
Start: 2023-12-22

## 2023-12-22 ASSESSMENT — SEVERITY OF ALOPECIA TOOL: % SCALP HAIR LOST: 25

## 2023-12-22 ASSESSMENT — LOCATION DETAILED DESCRIPTION DERM
LOCATION DETAILED: RIGHT MIDDLE FINGERNAIL
LOCATION DETAILED: RIGHT CENTRAL POSTAURICULAR SKIN
LOCATION DETAILED: LEFT MEDIAL MALAR CHEEK
LOCATION DETAILED: LEFT INDEX FINGER LUNULA
LOCATION DETAILED: RIGHT INDEX FINGERNAIL
LOCATION DETAILED: MEDIAL FRONTAL SCALP
LOCATION DETAILED: RIGHT THUMBNAIL
LOCATION DETAILED: LEFT THUMBNAIL

## 2023-12-22 ASSESSMENT — LOCATION SIMPLE DESCRIPTION DERM
LOCATION SIMPLE: LEFT CHEEK
LOCATION SIMPLE: FRONTAL SCALP
LOCATION SIMPLE: RIGHT MIDDLE FINGERNAIL
LOCATION SIMPLE: RIGHT INDEX FINGERNAIL
LOCATION SIMPLE: LEFT THUMBNAIL
LOCATION SIMPLE: SCALP
LOCATION SIMPLE: LEFT INDEX FINGERNAIL
LOCATION SIMPLE: RIGHT THUMBNAIL

## 2023-12-22 ASSESSMENT — LOCATION ZONE DERM
LOCATION ZONE: FINGERNAIL
LOCATION ZONE: FACE
LOCATION ZONE: SCALP

## 2023-12-22 NOTE — PROCEDURE: ADDITIONAL NOTES
Additional Notes: Recommended lidocaine patches to be placed behind ear to decrease neuropathic pain post face lift\\nConsider Gabapentin
Detail Level: Simple
Render Risk Assessment In Note?: no

## 2023-12-22 NOTE — PROCEDURE: PRESCRIPTION MEDICATION MANAGEMENT
Detail Level: Zone
Initiate Treatment: Compound triple rosacea cream (azelaic, metronidazole, soolantra)
Render In Strict Bullet Format?: No

## 2023-12-22 NOTE — PROCEDURE: DEFER
Detail Level: Detailed
X Size Of Lesion In Cm (Optional): 0
Introduction Text (Please End With A Colon): The following procedure was deferred: punch excision

## 2023-12-22 NOTE — PROCEDURE: RECOMMENDATIONS
Recommendation Preamble: The following recommendations were made during the visit:
Render Risk Assessment In Note?: no
Detail Level: Zone
Recommendations (Free Text): Capillarus hairmax

## 2024-02-08 ENCOUNTER — TRANSCRIBE ORDERS (OUTPATIENT)
Dept: SCHEDULING | Age: 64
End: 2024-02-08

## 2024-02-08 DIAGNOSIS — M81.0 AGE-RELATED OSTEOPOROSIS WITHOUT CURRENT PATHOLOGICAL FRACTURE: Primary | ICD-10-CM

## 2024-02-08 DIAGNOSIS — N83.202 UNSPECIFIED OVARIAN CYST, LEFT SIDE: ICD-10-CM

## 2024-02-14 ENCOUNTER — APPOINTMENT (OUTPATIENT)
Dept: URBAN - METROPOLITAN AREA CLINIC 203 | Age: 64
Setting detail: DERMATOLOGY
End: 2024-02-21

## 2024-02-14 ENCOUNTER — RX ONLY (RX ONLY)
Age: 64
End: 2024-02-14

## 2024-02-14 DIAGNOSIS — L71.8 OTHER ROSACEA: ICD-10-CM

## 2024-02-14 DIAGNOSIS — L03.01 CELLULITIS OF FINGER: ICD-10-CM

## 2024-02-14 DIAGNOSIS — L20.89 OTHER ATOPIC DERMATITIS: ICD-10-CM

## 2024-02-14 PROBLEM — L03.012 CELLULITIS OF LEFT FINGER: Status: ACTIVE | Noted: 2024-02-14

## 2024-02-14 PROCEDURE — OTHER COUNSELING: OTHER

## 2024-02-14 PROCEDURE — OTHER PRESCRIPTION: OTHER

## 2024-02-14 PROCEDURE — OTHER PRESCRIPTION MEDICATION MANAGEMENT: OTHER

## 2024-02-14 PROCEDURE — 99214 OFFICE O/P EST MOD 30 MIN: CPT

## 2024-02-14 RX ORDER — FLUCONAZOLE 100 MG/1
TABLET ORAL
Qty: 14 | Refills: 1 | Status: CANCELLED | COMMUNITY
Start: 2024-02-14

## 2024-02-14 RX ORDER — ADAPALENE 3 MG/G
GEL TOPICAL
Qty: 45 | Refills: 4 | Status: ERX | COMMUNITY
Start: 2024-02-14

## 2024-02-14 RX ORDER — TRETIONIN 0.5 MG/G
CREAM TOPICAL
Qty: 45 | Refills: 3 | Status: ERX | COMMUNITY
Start: 2024-02-14

## 2024-02-14 RX ORDER — PIMECROLIMUS 10 MG/G
CREAM TOPICAL
Qty: 30 | Refills: 3 | Status: ERX | COMMUNITY
Start: 2024-02-14

## 2024-02-14 RX ORDER — FLUCONAZOLE 100 MG/1
TABLET ORAL
Qty: 14 | Refills: 1 | Status: ERX | COMMUNITY
Start: 2024-02-14

## 2024-02-14 ASSESSMENT — LOCATION SIMPLE DESCRIPTION DERM
LOCATION SIMPLE: NOSE
LOCATION SIMPLE: RIGHT CHEEK
LOCATION SIMPLE: LEFT INDEX FINGER
LOCATION SIMPLE: LEFT CHEEK
LOCATION SIMPLE: LEFT THUMB

## 2024-02-14 ASSESSMENT — LOCATION DETAILED DESCRIPTION DERM
LOCATION DETAILED: LEFT CENTRAL MALAR CHEEK
LOCATION DETAILED: LEFT DISTAL RADIAL THUMB
LOCATION DETAILED: NASAL SUPRATIP
LOCATION DETAILED: RIGHT CENTRAL MALAR CHEEK
LOCATION DETAILED: NASAL DORSUM
LOCATION DETAILED: LEFT DISTAL DORSAL INDEX FINGER
LOCATION DETAILED: LEFT MEDIAL MALAR CHEEK

## 2024-02-14 ASSESSMENT — ITCH NUMERIC RATING SCALE: HOW SEVERE IS YOUR ITCHING?: 3

## 2024-02-14 ASSESSMENT — LOCATION ZONE DERM
LOCATION ZONE: FINGER
LOCATION ZONE: FACE
LOCATION ZONE: NOSE

## 2024-02-14 ASSESSMENT — SEVERITY ASSESSMENT 2020: SEVERITY 2020: MILD

## 2024-02-16 ENCOUNTER — HOSPITAL ENCOUNTER (OUTPATIENT)
Dept: RADIOLOGY | Facility: HOSPITAL | Age: 64
Discharge: HOME | End: 2024-02-16
Attending: GENERAL PRACTICE
Payer: COMMERCIAL

## 2024-02-16 DIAGNOSIS — N83.202 UNSPECIFIED OVARIAN CYST, LEFT SIDE: ICD-10-CM

## 2024-02-16 PROCEDURE — 76856 US EXAM PELVIC COMPLETE: CPT

## 2024-03-08 ENCOUNTER — TRANSCRIBE ORDERS (OUTPATIENT)
Dept: SCHEDULING | Age: 64
End: 2024-03-08

## 2024-03-08 DIAGNOSIS — S83.412A SPRAIN OF MEDIAL COLLATERAL LIGAMENT OF LEFT KNEE, INITIAL ENCOUNTER: ICD-10-CM

## 2024-03-08 DIAGNOSIS — S83.105A UNSPECIFIED DISLOCATION OF LEFT KNEE, INITIAL ENCOUNTER: Primary | ICD-10-CM

## 2024-03-11 ENCOUNTER — HOSPITAL ENCOUNTER (OUTPATIENT)
Dept: RADIOLOGY | Facility: HOSPITAL | Age: 64
Discharge: HOME | End: 2024-03-11
Attending: GENERAL PRACTICE
Payer: COMMERCIAL

## 2024-03-11 DIAGNOSIS — M81.0 AGE-RELATED OSTEOPOROSIS WITHOUT CURRENT PATHOLOGICAL FRACTURE: ICD-10-CM

## 2024-03-11 DIAGNOSIS — R14.0 ABDOMINAL DISTENSION (GASEOUS): ICD-10-CM

## 2024-03-11 DIAGNOSIS — Z12.31 ENCOUNTER FOR SCREENING MAMMOGRAM FOR MALIGNANT NEOPLASM OF BREAST: ICD-10-CM

## 2024-03-11 PROCEDURE — 77080 DXA BONE DENSITY AXIAL: CPT

## 2024-03-11 PROCEDURE — 77067 SCR MAMMO BI INCL CAD: CPT

## 2024-05-02 ENCOUNTER — APPOINTMENT (OUTPATIENT)
Dept: URBAN - METROPOLITAN AREA CLINIC 203 | Age: 64
Setting detail: DERMATOLOGY
End: 2024-05-06

## 2024-05-02 DIAGNOSIS — B35.1 TINEA UNGUIUM: ICD-10-CM

## 2024-05-02 PROCEDURE — OTHER PRESCRIPTION: OTHER

## 2024-05-02 PROCEDURE — OTHER PRESCRIPTION MEDICATION MANAGEMENT: OTHER

## 2024-05-02 PROCEDURE — 99213 OFFICE O/P EST LOW 20 MIN: CPT

## 2024-05-02 RX ORDER — CICLOPIROX 80 MG/ML
SOLUTION TOPICAL QD
Qty: 6.6 | Refills: 6 | Status: ERX

## 2024-05-02 ASSESSMENT — LOCATION SIMPLE DESCRIPTION DERM: LOCATION SIMPLE: LEFT GREAT TOE

## 2024-05-02 ASSESSMENT — LOCATION DETAILED DESCRIPTION DERM: LOCATION DETAILED: LEFT GREAT TOENAIL

## 2024-05-02 ASSESSMENT — LOCATION ZONE DERM: LOCATION ZONE: TOENAIL

## 2024-05-02 NOTE — PROCEDURE: PRESCRIPTION MEDICATION MANAGEMENT
Render In Strict Bullet Format?: No
Detail Level: Zone
Initiate Treatment: Ciclopirox q6 days, remove on 7th day with nail polish remover

## 2024-05-29 ENCOUNTER — TRANSCRIBE ORDERS (OUTPATIENT)
Dept: SCHEDULING | Age: 64
End: 2024-05-29

## 2024-05-29 DIAGNOSIS — R22.1 LOCALIZED SWELLING, MASS AND LUMP, NECK: Primary | ICD-10-CM

## 2024-05-29 DIAGNOSIS — M54.2 CERVICALGIA: ICD-10-CM

## 2024-05-31 ENCOUNTER — HOSPITAL ENCOUNTER (OUTPATIENT)
Dept: RADIOLOGY | Facility: HOSPITAL | Age: 64
Discharge: HOME | End: 2024-05-31
Attending: OTOLARYNGOLOGY
Payer: COMMERCIAL

## 2024-05-31 DIAGNOSIS — R22.1 LOCALIZED SWELLING, MASS AND LUMP, NECK: ICD-10-CM

## 2024-05-31 DIAGNOSIS — M54.2 CERVICALGIA: ICD-10-CM

## 2024-05-31 PROCEDURE — 76536 US EXAM OF HEAD AND NECK: CPT

## 2024-06-04 ENCOUNTER — TRANSCRIBE ORDERS (OUTPATIENT)
Dept: SCHEDULING | Age: 64
End: 2024-06-04

## 2024-06-04 DIAGNOSIS — K59.09 OTHER CONSTIPATION: Primary | ICD-10-CM

## 2024-06-06 ENCOUNTER — HOSPITAL ENCOUNTER (OUTPATIENT)
Dept: RADIOLOGY | Facility: HOSPITAL | Age: 64
Discharge: HOME | End: 2024-06-06
Attending: PHYSICIAN ASSISTANT
Payer: MEDICARE

## 2024-06-06 DIAGNOSIS — K59.09 OTHER CONSTIPATION: ICD-10-CM

## 2024-06-06 PROCEDURE — 76700 US EXAM ABDOM COMPLETE: CPT

## 2024-06-10 ENCOUNTER — HOSPITAL ENCOUNTER (OUTPATIENT)
Dept: RADIOLOGY | Facility: HOSPITAL | Age: 64
Discharge: HOME | End: 2024-06-10
Attending: PHYSICIAN ASSISTANT
Payer: MEDICARE

## 2024-06-10 DIAGNOSIS — K59.09 OTHER CONSTIPATION: ICD-10-CM

## 2024-06-10 PROCEDURE — 76856 US EXAM PELVIC COMPLETE: CPT

## 2024-06-27 ENCOUNTER — TRANSCRIBE ORDERS (OUTPATIENT)
Dept: SCHEDULING | Age: 64
End: 2024-06-27

## 2024-06-27 DIAGNOSIS — R74.01 ELEVATION OF LEVELS OF LIVER TRANSAMINASE LEVELS: Primary | ICD-10-CM

## 2024-07-10 ENCOUNTER — HOSPITAL ENCOUNTER (OUTPATIENT)
Dept: RADIOLOGY | Age: 64
Discharge: HOME | End: 2024-07-10
Attending: PHYSICIAN ASSISTANT
Payer: MEDICARE

## 2024-07-10 DIAGNOSIS — R74.01 ELEVATION OF LEVELS OF LIVER TRANSAMINASE LEVELS: ICD-10-CM

## 2024-07-10 PROCEDURE — 76981 USE PARENCHYMA: CPT

## 2024-10-01 ENCOUNTER — RX ONLY (RX ONLY)
Age: 64
End: 2024-10-01

## 2024-10-01 RX ORDER — BIMATOPROST 0.3 MG/ML
SOLUTION/ DROPS OPHTHALMIC
Qty: 2.5 | Refills: 0 | Status: ERX | COMMUNITY
Start: 2024-10-01

## 2025-01-29 ENCOUNTER — LAB REQUISITION (OUTPATIENT)
Dept: LAB | Facility: HOSPITAL | Age: 65
End: 2025-01-29
Attending: OTOLARYNGOLOGY
Payer: MEDICARE

## 2025-01-29 DIAGNOSIS — J32.4 CHRONIC PANSINUSITIS: ICD-10-CM

## 2025-01-29 PROCEDURE — 87206 SMEAR FLUORESCENT/ACID STAI: CPT | Performed by: OTOLARYNGOLOGY

## 2025-01-29 PROCEDURE — 87070 CULTURE OTHR SPECIMN AEROBIC: CPT | Performed by: OTOLARYNGOLOGY

## 2025-01-29 PROCEDURE — 87206 SMEAR FLUORESCENT/ACID STAI: CPT | Mod: 59 | Performed by: OTOLARYNGOLOGY

## 2025-01-29 PROCEDURE — 87102 FUNGUS ISOLATION CULTURE: CPT | Mod: 59 | Performed by: OTOLARYNGOLOGY

## 2025-01-29 PROCEDURE — 87102 FUNGUS ISOLATION CULTURE: CPT | Performed by: OTOLARYNGOLOGY

## 2025-01-30 LAB
FUNGUS STAIN: NORMAL
FUNGUS STAIN: NORMAL

## 2025-02-01 LAB
MICROORGANISM SPEC CULT: ABNORMAL

## 2025-02-02 LAB
MICROORGANISM SPEC CULT: ABNORMAL

## 2025-02-14 ENCOUNTER — HOSPITAL ENCOUNTER (OUTPATIENT)
Facility: CLINIC | Age: 65
Discharge: HOME | End: 2025-02-14
Attending: FAMILY MEDICINE
Payer: COMMERCIAL

## 2025-02-14 ENCOUNTER — TRANSCRIBE ORDERS (OUTPATIENT)
Dept: SCHEDULING | Age: 65
End: 2025-02-14

## 2025-02-14 VITALS
HEIGHT: 61 IN | WEIGHT: 120 LBS | OXYGEN SATURATION: 98 % | TEMPERATURE: 100.9 F | DIASTOLIC BLOOD PRESSURE: 70 MMHG | RESPIRATION RATE: 16 BRPM | BODY MASS INDEX: 22.66 KG/M2 | HEART RATE: 77 BPM | SYSTOLIC BLOOD PRESSURE: 110 MMHG

## 2025-02-14 DIAGNOSIS — J32.4 CHRONIC PANSINUSITIS: Primary | ICD-10-CM

## 2025-02-14 DIAGNOSIS — J10.1 INFLUENZA A: Primary | ICD-10-CM

## 2025-02-14 LAB
EXPIRATION DATE: ABNORMAL
Lab: ABNORMAL
POCT MANUFACTURER: ABNORMAL
RAPID INFLUENZA A AGN: POSITIVE
RAPID INFLUENZA B AGN: NEGATIVE

## 2025-02-14 PROCEDURE — 99202 OFFICE O/P NEW SF 15 MIN: CPT | Performed by: NURSE PRACTITIONER

## 2025-02-14 PROCEDURE — 87804 INFLUENZA ASSAY W/OPTIC: CPT | Performed by: NURSE PRACTITIONER

## 2025-02-14 PROCEDURE — S9083 URGENT CARE CENTER GLOBAL: HCPCS | Performed by: NURSE PRACTITIONER

## 2025-02-14 ASSESSMENT — ENCOUNTER SYMPTOMS
HEADACHES: 1
WHEEZING: 0
FATIGUE: 1
BACK PAIN: 1
FEVER: 1
COUGH: 1
MYALGIAS: 1
CARDIOVASCULAR NEGATIVE: 1
SHORTNESS OF BREATH: 0
CHILLS: 1

## 2025-02-14 NOTE — Clinical Note
Jalil Guerra was seen and treated in our urgent care on 2/14/2025.  She may return to work on 02/18/2025.       If you have any questions or concerns, please don't hesitate to call.      Elba Lima CRNP

## 2025-02-14 NOTE — ED PROVIDER NOTES
Emergency Medicine Note  HPI   HISTORY OF PRESENT ILLNESS     65 y/o female presents today with c/o cough, body aches, chills, fevers, headache and back pain. Symptoms started 3 days ago.           Patient History   PAST HISTORY     Reviewed from Nursing Triage:       Past Medical History:   Diagnosis Date    Acne     Constipation     Migraine     Osteoporosis        Past Surgical History   Procedure Laterality Date    Ankle surgery Right     Reconstruction    Cholecystectomy      Laparoscopy, right Salpingo-Oophorectomy/left Salpingectomy N/A 2/26/2019    Performed by Kennedy Montanez [place] [facility] [place]    Pubovaginal sling         Family History   Problem Relation Name Age of Onset    Diverticulitis Biological Mother      Breast cancer Neg Hx      Ovarian cancer Neg Hx      Colon cancer Neg Hx         Social History     Tobacco Use    Smoking status: Never    Smokeless tobacco: Never   Substance Use Topics    Alcohol use: No    Drug use: No         Review of Systems   REVIEW OF SYSTEMS     Review of Systems   Constitutional:  Positive for chills, fatigue and fever.   HENT:  Positive for congestion.    Respiratory:  Positive for cough. Negative for shortness of breath and wheezing.    Cardiovascular: Negative.    Musculoskeletal:  Positive for back pain and myalgias.   Neurological:  Positive for headaches.         VITALS     ED Vitals      Date/Time Temp Pulse Resp BP SpO2 [facility]   02/14/25 1003 38.3 °C (100.9 °F) 77 16 110/70 98 % LP                         Physical Exam   PHYSICAL EXAM     Physical Exam  Vitals reviewed.   Constitutional:       Appearance: Normal appearance.   Cardiovascular:      Rate and Rhythm: Normal rate and regular rhythm.      Heart sounds: Normal heart sounds, S1 normal and S2 normal.   Pulmonary:      Effort: Pulmonary effort is normal.      Breath sounds: Normal breath sounds and air entry.   Musculoskeletal:      Cervical back: Normal range of motion and neck supple.      Comments: Generalized  back pain. No spinal tenderness   Lymphadenopathy:      Cervical: No cervical adenopathy.   Neurological:      Mental Status: She is alert and oriented to person, place, and time.   Psychiatric:         Mood and Affect: Mood normal.         Behavior: Behavior normal.         Thought Content: Thought content normal.         Judgment: Judgment normal.           PROCEDURES     Procedures     DATA     Results       None                No orders to display       Scoring tools                                  ED Course & MDM   MDM / ED COURSE / CLINICAL IMPRESSION / DISPO     Medical Decision Making  +Flu A  -Fluids  -NSAIDS and or Tylenol PRN pain           Clinical Impression      None                 Elba Lima CRNP  02/14/25 6133

## 2025-02-14 NOTE — DISCHARGE INSTRUCTIONS
You tested positive for Influenza A  Ibuprofen 600m every 6 hours with food or Extra strength Tylenol every 6-8 hours as needed for pain/fevers  Stay well hydrated  Continue  Mucinex as needed for congestion.

## 2025-02-17 NOTE — ED ATTESTATION NOTE
I was immediately available to provide supervision and direction for the care of the patient.    The patient was evaluated and managed by the nurse practitioner.       Gautam Davis DO  02/17/25 0911

## 2025-02-24 ENCOUNTER — HOSPITAL ENCOUNTER (OUTPATIENT)
Dept: RADIOLOGY | Age: 65
Discharge: HOME | End: 2025-02-24
Attending: OTOLARYNGOLOGY
Payer: MEDICARE

## 2025-02-24 DIAGNOSIS — J32.4 CHRONIC PANSINUSITIS: ICD-10-CM

## 2025-02-24 PROCEDURE — 70486 CT MAXILLOFACIAL W/O DYE: CPT

## 2025-02-27 LAB
FUNGUS SPEC CULT: NORMAL
FUNGUS SPEC CULT: NORMAL

## 2025-03-18 ENCOUNTER — LAB REQUISITION (OUTPATIENT)
Dept: LAB | Facility: HOSPITAL | Age: 65
End: 2025-03-18
Attending: OTOLARYNGOLOGY
Payer: MEDICARE

## 2025-03-18 DIAGNOSIS — J32.0 CHRONIC MAXILLARY SINUSITIS: ICD-10-CM

## 2025-03-18 PROCEDURE — 87070 CULTURE OTHR SPECIMN AEROBIC: CPT | Mod: 59 | Performed by: OTOLARYNGOLOGY

## 2025-03-20 LAB
MICROORGANISM SPEC CULT: NORMAL
MICROORGANISM SPEC CULT: NORMAL

## 2025-04-11 ENCOUNTER — TRANSCRIBE ORDERS (OUTPATIENT)
Dept: SCHEDULING | Age: 65
End: 2025-04-11

## 2025-04-11 DIAGNOSIS — Z12.31 OTHER SCREENING MAMMOGRAM: Primary | ICD-10-CM

## 2025-04-11 DIAGNOSIS — N83.201 BILATERAL OVARIAN CYSTS: ICD-10-CM

## 2025-04-11 DIAGNOSIS — N83.202 BILATERAL OVARIAN CYSTS: ICD-10-CM

## 2025-04-22 ENCOUNTER — HOSPITAL ENCOUNTER (OUTPATIENT)
Dept: RADIOLOGY | Facility: HOSPITAL | Age: 65
Discharge: HOME | End: 2025-04-22
Attending: GENERAL PRACTICE
Payer: MEDICARE

## 2025-04-22 DIAGNOSIS — N83.201 BILATERAL OVARIAN CYSTS: ICD-10-CM

## 2025-04-22 DIAGNOSIS — N83.202 BILATERAL OVARIAN CYSTS: ICD-10-CM

## 2025-04-22 DIAGNOSIS — Z12.31 OTHER SCREENING MAMMOGRAM: ICD-10-CM

## 2025-04-22 PROCEDURE — 77063 BREAST TOMOSYNTHESIS BI: CPT

## 2025-04-22 PROCEDURE — 76856 US EXAM PELVIC COMPLETE: CPT | Mod: 59

## 2025-04-24 ENCOUNTER — HOSPITAL ENCOUNTER (OUTPATIENT)
Dept: RADIOLOGY | Facility: HOSPITAL | Age: 65
Discharge: HOME | End: 2025-04-24
Attending: RADIOLOGY
Payer: MEDICARE

## 2025-04-24 DIAGNOSIS — R92.8 ABNORMAL MAMMOGRAM: ICD-10-CM

## 2025-04-24 PROCEDURE — 76642 ULTRASOUND BREAST LIMITED: CPT | Mod: RT

## 2025-04-24 PROCEDURE — G0279 TOMOSYNTHESIS, MAMMO: HCPCS | Mod: RT

## 2025-05-07 ENCOUNTER — LAB REQUISITION (OUTPATIENT)
Dept: LAB | Facility: HOSPITAL | Age: 65
End: 2025-05-07
Attending: OTOLARYNGOLOGY
Payer: MEDICARE

## 2025-05-07 DIAGNOSIS — J32.4 CHRONIC PANSINUSITIS: ICD-10-CM

## 2025-05-07 PROCEDURE — 87206 SMEAR FLUORESCENT/ACID STAI: CPT | Performed by: OTOLARYNGOLOGY

## 2025-05-07 PROCEDURE — 87102 FUNGUS ISOLATION CULTURE: CPT | Mod: 59 | Performed by: OTOLARYNGOLOGY

## 2025-05-07 PROCEDURE — 87077 CULTURE AEROBIC IDENTIFY: CPT | Mod: 59 | Performed by: OTOLARYNGOLOGY

## 2025-05-09 LAB
MICROORGANISM SPEC CULT: ABNORMAL

## 2025-05-10 LAB
FUNGUS SPEC CULT: NORMAL
FUNGUS SPEC CULT: NORMAL
FUNGUS STAIN: NORMAL
FUNGUS STAIN: NORMAL

## 2025-05-16 ENCOUNTER — TELEPHONE (OUTPATIENT)
Dept: ENDOCRINOLOGY | Facility: CLINIC | Age: 65
End: 2025-05-16

## 2025-05-16 ENCOUNTER — OFFICE VISIT (OUTPATIENT)
Dept: ENDOCRINOLOGY | Facility: CLINIC | Age: 65
End: 2025-05-16
Payer: MEDICARE

## 2025-05-16 VITALS
SYSTOLIC BLOOD PRESSURE: 130 MMHG | BODY MASS INDEX: 23.15 KG/M2 | RESPIRATION RATE: 16 BRPM | HEIGHT: 61 IN | WEIGHT: 122.6 LBS | HEART RATE: 73 BPM | OXYGEN SATURATION: 99 % | DIASTOLIC BLOOD PRESSURE: 78 MMHG

## 2025-05-16 DIAGNOSIS — R79.89 ELEVATED CORTISOL LEVEL: Primary | ICD-10-CM

## 2025-05-16 PROCEDURE — 99204 OFFICE O/P NEW MOD 45 MIN: CPT | Performed by: INTERNAL MEDICINE

## 2025-05-16 RX ORDER — NYSTATIN 500000 [USP'U]/1
TABLET, COATED ORAL
COMMUNITY
Start: 2025-02-01

## 2025-05-16 RX ORDER — ESTRADIOL 0.05 MG/D
FILM, EXTENDED RELEASE TRANSDERMAL
COMMUNITY
Start: 2025-02-01

## 2025-05-16 RX ORDER — PROGESTERONE 100 MG/1
CAPSULE ORAL
COMMUNITY
Start: 2025-02-01

## 2025-05-16 RX ORDER — CHOLECALCIFEROL (VITAMIN D3) 25 MCG
1000 TABLET ORAL DAILY
COMMUNITY

## 2025-05-16 RX ORDER — DEXAMETHASONE 1 MG/1
TABLET ORAL
Qty: 1 TABLET | Refills: 1 | Status: SHIPPED | OUTPATIENT
Start: 2025-05-16

## 2025-05-16 RX ORDER — NYSTATIN AND TRIAMCINOLONE ACETONIDE 100000; 1 [USP'U]/G; MG/G
OINTMENT TOPICAL
COMMUNITY

## 2025-05-16 RX ORDER — AZELASTINE 1 MG/ML
SPRAY, METERED NASAL
COMMUNITY
Start: 2025-05-06

## 2025-06-02 ENCOUNTER — HOSPITAL ENCOUNTER (OUTPATIENT)
Facility: CLINIC | Age: 65
Discharge: HOME | End: 2025-06-02
Attending: FAMILY MEDICINE
Payer: MEDICARE

## 2025-06-02 ENCOUNTER — APPOINTMENT (OUTPATIENT)
Dept: RADIOLOGY | Age: 65
End: 2025-06-02
Attending: FAMILY MEDICINE
Payer: MEDICARE

## 2025-06-02 VITALS
DIASTOLIC BLOOD PRESSURE: 88 MMHG | OXYGEN SATURATION: 98 % | SYSTOLIC BLOOD PRESSURE: 118 MMHG | TEMPERATURE: 98.3 F | HEART RATE: 67 BPM

## 2025-06-02 DIAGNOSIS — J20.9 ACUTE BRONCHITIS, UNSPECIFIED ORGANISM: Primary | ICD-10-CM

## 2025-06-02 PROCEDURE — 71046 X-RAY EXAM CHEST 2 VIEWS: CPT | Performed by: FAMILY MEDICINE

## 2025-06-02 PROCEDURE — 99214 OFFICE O/P EST MOD 30 MIN: CPT | Performed by: FAMILY MEDICINE

## 2025-06-02 RX ORDER — BENZONATATE 100 MG/1
100 CAPSULE ORAL 3 TIMES DAILY PRN
Qty: 21 CAPSULE | Refills: 0 | Status: SHIPPED | OUTPATIENT
Start: 2025-06-02

## 2025-06-02 RX ORDER — AZITHROMYCIN 250 MG/1
TABLET, FILM COATED ORAL
Qty: 6 TABLET | Refills: 0 | Status: SHIPPED | OUTPATIENT
Start: 2025-06-02 | End: 2025-06-07

## 2025-06-02 ASSESSMENT — ENCOUNTER SYMPTOMS
SHORTNESS OF BREATH: 0
WHEEZING: 0
SINUS CONGESTION: 1
FEVER: 0
CHILLS: 0
COUGH: 1

## 2025-06-02 NOTE — DISCHARGE INSTRUCTIONS
Chest x-ray normal  Prescribed Z-Palmer to cover for atypical bacteria  Prescribed Tessalon Perles 100 mg 3 times a day as needed for cough  Stay hydrated  Follow-up with primary care physician if symptoms persist

## 2025-06-02 NOTE — ED PROVIDER NOTES
History  Chief Complaint   Patient presents with    Cough     Pt states she's had a cough and congestion for 10 days. Fever for the first 2 days.   At home covid test done on Memorial day- Negative  Has used dayquil and nightquil      Patient started with cold symptoms 10 days ago.  Initially, she had a fever but that has subsided.  Her cough and congestion have persisted.  The cough is productive with yellowish sputum.  She denies any chest pain or shortness of breath.      Cough  Cough characteristics:  Productive  Sputum characteristics:  Yellow  Severity:  Moderate  Onset quality:  Gradual  Duration:  10 days  Progression:  Unchanged  Chronicity:  New  Ineffective treatments:  Cough suppressants  Associated symptoms: sinus congestion    Associated symptoms: no chills, no fever, no shortness of breath and no wheezing        Past Medical History:   Diagnosis Date    Acne     Constipation     Migraine     Osteoporosis        Past Surgical History   Procedure Laterality Date    Ankle surgery Right     Reconstruction    Cholecystectomy      Laparoscopy, right Salpingo-Oophorectomy/left Salpingectomy N/A 2/26/2019    [place] [facility] [place]    Pubovaginal sling         Family History   Problem Relation Name Age of Onset    Diverticulitis Biological Mother      Breast cancer Neg Hx      Ovarian cancer Neg Hx      Colon cancer Neg Hx         Social History     Tobacco Use    Smoking status: Never    Smokeless tobacco: Never   Substance Use Topics    Alcohol use: No    Drug use: No       Review of Systems   Constitutional:  Negative for chills and fever.   HENT:  Positive for congestion.    Respiratory:  Positive for cough. Negative for shortness of breath and wheezing.        Physical Exam  ED Triage Vitals [06/02/25 1451]   Temp Heart Rate Resp BP SpO2   36.8 °C (98.3 °F) 67 -- 118/88 98 %      Temp src Heart Rate Source Patient Position BP Location FiO2 (%) (Set)   -- -- Sitting Left upper arm --        Physical Exam  Vitals reviewed.   Constitutional:       General: She is not in acute distress.     Appearance: Normal appearance. She is not ill-appearing.   HENT:      Right Ear: Tympanic membrane, ear canal and external ear normal.      Left Ear: Tympanic membrane, ear canal and external ear normal.      Nose: Nose normal.      Mouth/Throat:      Mouth: Mucous membranes are moist.      Pharynx: Oropharynx is clear.   Cardiovascular:      Rate and Rhythm: Normal rate and regular rhythm.      Heart sounds: Normal heart sounds.   Pulmonary:      Effort: Pulmonary effort is normal. No respiratory distress.      Breath sounds: Examination of the right-lower field reveals decreased breath sounds. Decreased breath sounds present. No wheezing or rhonchi.   Neurological:      Mental Status: She is alert.           Procedures  Procedures    UC Course   Bronchitis    Medical Decision Making  Chest x-ray normal  Prescribed Z-Palmer to cover for atypical bacteria  Prescribed Tessalon Perles 100 mg 3 times a day as needed for cough  Stay hydrated  Follow-up with primary care physician if symptoms persist                 Gini Licea DO  06/02/25 1550

## 2025-06-03 ENCOUNTER — TELEPHONE (OUTPATIENT)
Dept: ENDOCRINOLOGY | Facility: CLINIC | Age: 65
End: 2025-06-03
Payer: MEDICARE

## 2025-06-05 LAB
FUNGUS SPEC CULT: NORMAL
FUNGUS SPEC CULT: NORMAL

## 2025-06-08 LAB
BUN SERPL-MCNC: 25 MG/DL (ref 7–25)
BUN/CREAT SERPL: NORMAL (CALC) (ref 6–22)
CALCIUM SERPL-MCNC: 9.6 MG/DL (ref 8.6–10.4)
CHLORIDE SERPL-SCNC: 105 MMOL/L (ref 98–110)
CO2 SERPL-SCNC: 27 MMOL/L (ref 20–32)
CORTIS SERPL-MCNC: 15.1 MCG/DL
CREAT SERPL-MCNC: 0.86 MG/DL (ref 0.5–1.05)
EGFRCR SERPLBLD CKD-EPI 2021: 75 ML/MIN/1.73M2
GLUCOSE SERPL-MCNC: 100 MG/DL (ref 65–139)
POTASSIUM SERPL-SCNC: 5 MMOL/L (ref 3.5–5.3)
SODIUM SERPL-SCNC: 140 MMOL/L (ref 135–146)

## 2025-06-09 ENCOUNTER — RESULTS FOLLOW-UP (OUTPATIENT)
Dept: ENDOCRINOLOGY | Facility: CLINIC | Age: 65
End: 2025-06-09

## 2025-06-09 ENCOUNTER — TELEPHONE (OUTPATIENT)
Dept: SCHEDULING | Facility: CLINIC | Age: 65
End: 2025-06-09
Payer: MEDICARE

## 2025-06-09 LAB
CORTIS F 24H UR-MRATE: 184 MCG/24 H (ref 4–50)
CORTIS F/CREAT 24H UR: 191.7 MCG/G CREAT
CREAT 24H UR-MRATE: 0.96 G/24 H (ref 0.5–2.15)
SPECIMEN VOL 24H UR: 1400 ML

## 2025-06-09 NOTE — TELEPHONE ENCOUNTER
New Patient Appointment Request    Name of caller: Lucien    Reason for Visit: est care    Insurance: medicare    Insurance ID #: in epic    Recent Cardiac Test/Procedures: n/a    Referred by: spouse-pt of     Primary Care Physician: Danna Rubio MD      Previous Cardiologist name and phone number: n/a    Best contact number: [phone]      Additional notes/History:

## 2025-06-10 ENCOUNTER — TELEPHONE (OUTPATIENT)
Dept: ENDOCRINOLOGY | Facility: CLINIC | Age: 65
End: 2025-06-10

## 2025-06-10 DIAGNOSIS — R79.89 ELEVATED CORTISOL LEVEL: Primary | ICD-10-CM

## 2025-06-10 RX ORDER — DEXAMETHASONE 4 MG/1
8 TABLET ORAL
Qty: 2 TABLET | Refills: 0 | Status: SHIPPED | OUTPATIENT
Start: 2025-06-10 | End: 2025-06-11 | Stop reason: SDUPTHER

## 2025-06-10 NOTE — TELEPHONE ENCOUNTER
Office Visit on 05/16/2025   Component Date Value Ref Range Status    Total Volume 06/03/2025 1,400  mL Final    Cortisol Free Ur 06/03/2025 184.0 (H)  4.0 - 50.0 mcg/24 h Final    Cortisol, Free, Urine 06/03/2025 191.7 (H)  mcg/g creat Final    Comment:                                       Reference Range:                                        ADULTS: 3.1-42.3      Creatinine, Urine 06/03/2025 0.96  0.50 - 2.15 g/24 h Final    Comment:    This test was developed and its analytical performance  characteristics have been determined by [employer].  It has not been cleared or approved by the FDA. This assay  has been validated pursuant to the CLIA regulations and is  used for clinical purposes.         Cortisol, Total 06/07/2025 15.1  mcg/dL Final    Comment: Reference Range: For 8 a.m.(7-9 a.m.) Specimen: 4.0-22.0  Reference Range: For 4 p.m.(3-5 p.m.) Specimen: 3.0-17.0    * Please interpret above results accordingly *         Glucose 06/07/2025 100  65 - 139 mg/dL Corrected    Comment:           Non-fasting reference interval        This amended report is being issued to identify the  appropriate reference range and result flagging for  glucose testing. The original report incorrectly flagged  the result as High (above the reference range) with a  fasting reference range, when a non-fasting reference  range was indicated. Please disregard the reference  range and flagging in the original report.  For someone without known diabetes, a glucose value  between 100 and 125 mg/dL is consistent with  prediabetes and should be confirmed with a  follow-up test.     PLEASE DISREGARD PREVIOUSLY REPORTED INFORMATION BELOW:  (The information below was originally reported on 06/08/2025 at 4:14 AM)  GLUCOSE                       100      H                            Fasting reference interval           For someone without known diabetes, a glucose value   between 100 and 125 mg/dL is consistent with   prediabetes and  should be confirmed with a   follow-up test.              BUN 06/07/2025 25  7 - 25 mg/dL Final    Creatinine 06/07/2025 0.86  0.50 - 1.05 mg/dL Final    EGFR 06/07/2025 75  > OR = 60 mL/min/1.73m2 Final    Bun/Creatinine Ratio 06/07/2025 SEE NOTE:  6 - 22 (calc) Final    Comment:    Not Reported: BUN and Creatinine are within     reference range.            Sodium 06/07/2025 140  135 - 146 mmol/L Final    Potassium 06/07/2025 5.0  3.5 - 5.3 mmol/L Final    Chloride 06/07/2025 105  98 - 110 mmol/L Final    Carbon Dioxide 06/07/2025 27  20 - 32 mmol/L Final    Calcium 06/07/2025 9.6  8.6 - 10.4 mg/dL Final     Cortisol after dex sup test was done 6/7: 15.1  24 hour Urine free cortisol was 191.7.    Both were abn.     Called pt  She had stopped all her supplement.   Will repeat AM lab   if consistent with CD, I will order 8 mg Dex sup and order sella MRI

## 2025-06-21 LAB
COLLECT DATE: NORMAL
COLLECT DATE: NORMAL
COLLECT TME SPEC: NORMAL
COLLECT TME SPEC: NORMAL
QUEST CORTISOL, SALIVA SAMPLE 1: 0.3 MCG/DL
QUEST CORTISOL, SALIVA SAMPLE 2: 0.15 MCG/DL

## 2025-06-25 ENCOUNTER — TELEPHONE (OUTPATIENT)
Dept: CARDIOLOGY | Facility: CLINIC | Age: 65
End: 2025-06-25

## 2025-06-25 ENCOUNTER — OFFICE VISIT (OUTPATIENT)
Dept: CARDIOLOGY | Facility: CLINIC | Age: 65
End: 2025-06-25
Payer: MEDICARE

## 2025-06-25 VITALS
SYSTOLIC BLOOD PRESSURE: 128 MMHG | HEIGHT: 61 IN | BODY MASS INDEX: 22.75 KG/M2 | HEART RATE: 53 BPM | WEIGHT: 120.5 LBS | DIASTOLIC BLOOD PRESSURE: 70 MMHG | RESPIRATION RATE: 15 BRPM

## 2025-06-25 DIAGNOSIS — R06.09 DYSPNEA ON EXERTION: Primary | ICD-10-CM

## 2025-06-25 DIAGNOSIS — G90.9 DISORDER OF AUTONOMIC NERVOUS SYSTEM: ICD-10-CM

## 2025-06-25 DIAGNOSIS — U09.9 LONG COVID: ICD-10-CM

## 2025-06-25 LAB
ATRIAL RATE: 53
P AXIS: 51
PR INTERVAL: 158
QRS DURATION: 80
QT INTERVAL: 426
QTC CALCULATION(BAZETT): 399
R AXIS: 57
T WAVE AXIS: 40
VENTRICULAR RATE: 53

## 2025-06-25 PROCEDURE — 93000 ELECTROCARDIOGRAM COMPLETE: CPT | Performed by: INTERNAL MEDICINE

## 2025-06-25 PROCEDURE — 99204 OFFICE O/P NEW MOD 45 MIN: CPT | Performed by: INTERNAL MEDICINE

## 2025-06-25 RX ORDER — ASCORBIC ACID 250 MG
250 TABLET ORAL DAILY
COMMUNITY

## 2025-06-25 RX ORDER — BIOTIN 1000 MCG
1000 TABLET,CHEWABLE ORAL DAILY
COMMUNITY

## 2025-06-25 RX ORDER — NYSTATIN 100000 U/G
CREAM TOPICAL 2 TIMES DAILY
COMMUNITY

## 2025-06-25 NOTE — PROGRESS NOTES
Cristino Nguyen D.O., Located within [facility]    Clinical Cardiology and Heart Failure     [facility]     [facility]  [facility] [address]  [address] [place] [address]     TEL  [phone]  [facility]       2025    Re:  Jalil Guerra  : 1960    Dear Danna Rubio MD:    I had the pleasure of seeing your patient, Jalil Guerra, as a self-directed new patient evaluation upon the recommendation of her , Ed, more as a proactive evaluation and her concern for possible long COVID syndrome with chronic exertional dyspnea.    Jalil is a pleasant 65-year-old female with a history of mild hypertension and hyperlipidemia, though she has disputed this over the past few years, along with an elevated cortisol level being evaluated by Yamileth Cole. She has done a dexamethasone suppression test and the evaluation is ongoing. She has curtailed her cardio conditioning reducing stationary bicycle exercises from 60-minute workouts 10 to 30-minute workouts and she doesn't really push herself too hard. However walking up several flights of stairs in [place] [facility], she was notably breathless and even doing housecleaning has brought on more dyspnea than she has been used to. She has no chest pain or pressure per se.    PAST MEDICAL HISTORY:  All aspects of this documentation have been updated and/or entered into our EHR. She had 1 over very removed along with both fallopian tubes along with cholecystectomy, tonsillectomy, hypercortisolemia, hypertension, hyperlipidemia    SOCIAL HISTORY: She is  to her , Ed, and has a grown daughter. She is a / at [employer]. She has never smoked.    FAMILY HISTORY: Her mother  at age 86 of IPF and also had CHF at the end. Her father  89 from Alzheimer's dementia and also had a coronary stent in his 70s. She has a brother and sister. Her brother has hyperlipidemia.  "Her sister had Cushing syndrome and adrenalectomy at age 14.    MEDICATIONS:   Current Outpatient Medications   Medication Instructions    ascorbic acid (VITAMIN C) 250 mg, Daily    azelastine (ASTELIN) 137 mcg (0.1 %) nasal spray     benzonatate (TESSALON) 100 mg, oral, 3 times daily PRN    biotin 1,000 mcg, Daily    cholecalciferol (vitamin D3) 1,000 Units, Daily    cholecalciferol, vitD3,/vit K2 (VITAMIN D3-VITAMIN K2 ORAL) 1 tablet, Daily    dexAMETHasone (DECADRON) 4 mg tablet 8 mg once at 11 pm the night before the blood draw next day for cortisol around 8 am.    estradioL (VIVELLE-DOT) 0.05 mg/24 hr semiweekly patch     GLUTATHIONE ORAL 1 tablet, Daily    inulin (PREBIOTIC FIBER ORAL) 1 tablet, Daily    Lactobacillus acidophilus (PROBIOTIC ACIDOPHILUS ORAL) 1 tablet, Daily    multivitamin tablet 1 tablet, Daily    nystatin (MYCOSTATIN) 100,000 unit/gram cream 2 times daily    nystatin (MYCOSTATIN) 500,000 unit tablet     nystatin-triamcinolone (MYCOLOG II) ointment APPLY TWICE DAILY TO SKIN AROUND FINGERNAILS FOR 2-3 WEEKS    progesterone (PROMETRIUM) 100 mg capsule     SAW PALMETTO ORAL 1 tablet, Daily     ALLERGIES: NKDA    REVIEW OF SYSTEMS: Aside from what is mentioned above, a 14 point review of systems was performed and was negative.    PHYSICAL EXAMINATION:  Visit Vitals  /70   Pulse (!) 53   Resp 15   Ht 1.549 m (5' 1\")   Wt 54.7 kg (120 lb 8 oz)   BMI 22.77 kg/m²   Body surface area is 1.53 meters squared.  General: Pleasant and in no acute distress.  HEENT: No corneal arcus or xanthelasmas.  Sclerae are anicteric.  Nares patent.  Mucous membranes moist.  Neck: Supple.  JVP is 4 cm/H2O.  Carotids are equal with no audible bruits.    Heart: Regular.  Normal S1 and S2.  No S4. No S3. No murmur.  Chest: Symmetrical.  Lungs: Clear bilaterally without rales, wheezes nor rhonchi.  Abdomen: Soft, nontender.  No masses or bruits.  No organomegaly.  Normal bowel sounds.  Extremities: No cyanosis, " "clubbing or edema.  Distal pulses are palpable.  Skin: Warm and dry and well perfused.  Neurologic: Alert and oriented ×3.  Cranial nerves II through XII are intact.  Psychiatric: Normal mood, affect & judgment.    DIAGNOSTIC DATA:    EKG: Sinus bradycardia, normal axis and intervals. This is a normal tracing.    Labs:   Lab Results   Component Value Date     06/07/2025    K 5.0 06/07/2025    BUN 25 06/07/2025    CREATININE 0.86 06/07/2025    EGFR 75 06/07/2025    WBC 10.66 (H) 04/04/2022    HGB 14.7 04/04/2022     04/04/2022    AST 32 04/04/2022    ALT 42 04/04/2022    GLUCOSE 100 06/07/2025       Lipid Profile:     No results found for: \"CHOL\"  No results found for: \"TRIG\"  No results found for: \"HDL\"  No results found for: \"LDLCALC\"        IMPRESSION/RECOMMENDATIONS:  Dyspnea on exertion - her symptoms have been prevalent since she had COVID-19 a few years back. On one hand she is able to do 30 minutes on the stationary bicycle and Pilates classes but on the other hand has trouble with steps and housecleaning. Given the unexplained dyspnea I have asked her undergo a cardiopulmonary exercise test (CPET). I highly doubt a cardiac etiology to this given her normal examination and normal twelve-lead EKG.  Possible long COVID syndrome - given her concern for this I have asked her to undergo an ANSAR test to assess sympathetic and parasympathetic axes and autonomic nervous system function or dysfunction.  Mild hyperlipidemia - I reviewed her lipid profile trend outlined above. She has had issues with hypercortisolemia, potentially iatrogenic from supplements, and I told her this could contribute to this problem more could be aggravated by postmenopausal state.  Cushing syndrome - this may be iatrogenic from supplements. She is following with Yamileth Cole and has had dexamethasone suppression testing.    Dr. Rubio, thank you for allowing me to share in the care of your patient.  I asked Jalil to return on " an as a basis unless something shows up on her testing.  If you have any further questions, please do not hesitate to contact me.    Sincerely,    Cristino Nguyen D.O., [facility]

## 2025-06-25 NOTE — PATIENT INSTRUCTIONS
Issues with shortness of breath with exertion with stairs and cleaning etc. at least a few years since bouts of COVID and potential long COVID syndrome here  High cortisol level being evaluated by Yamileth Cole  Blood pressure improved nicely simply by deep breathing and relaxation  Please schedule ANSAR test  Please schedule cardiopulmonary exercise test; see if these 2 tests can be done on the same day  We will contact you with the results  Return as needed unless something shows up on the testing

## 2025-06-25 NOTE — LETTER
2025     Danna Rubio MD  [address] [facility] [address]  [facility] [address]    Patient: Jalil Guerra  YOB: 1960  Date of Visit: 2025      Dear Dr. Rubio:    Thank you for referring Jalil Guerra to me for evaluation. Below are my notes for this consultation.    If you have questions, please do not hesitate to call me. I look forward to following your patient along with you.         Sincerely,        Cristino Nguyen,         CC: MD Patrick Calvillo Christopher J, DO  2025 10:52 PM  Sign when Signing Visit       Cristino Nguyen D.O., [facility]    Clinical Cardiology and Heart Failure     [facility] HEART [facility]  The Heart [facility] Level  [address] [place] [address]     TEL  [phone]  [facility]       2025    Re:  Jalil Guerra  : 1960    Dear Danna Rubio MD:    I had the pleasure of seeing your patient, Jalil Guerra, as a self-directed new patient evaluation upon the recommendation of her , Ed, more as a proactive evaluation and her concern for possible long COVID syndrome with chronic exertional dyspnea.    Jalil is a pleasant 65-year-old female with a history of mild hypertension and hyperlipidemia, though she has disputed this over the past few years, along with an elevated cortisol level being evaluated by Yamileth Cole. She has done a dexamethasone suppression test and the evaluation is ongoing. She has curtailed her cardio conditioning reducing stationary bicycle exercises from 60-minute workouts 10 to 30-minute workouts and she doesn't really push herself too hard. However walking up several flights of stairs in [place] at the [employer], she was notably breathless and even doing housecleaning has brought on more dyspnea than she has been used to. She has no chest pain or pressure per se.    PAST MEDICAL HISTORY:  All aspects of this documentation  have been updated and/or entered into our EHR. She had 1 over very removed along with both fallopian tubes along with cholecystectomy, tonsillectomy, hypercortisolemia, hypertension, hyperlipidemia    SOCIAL HISTORY: She is  to her , Ed, and has a grown daughter. She is a / at [employer]. She has never smoked.    FAMILY HISTORY: Her mother  at age 86 of IPF and also had CHF at the end. Her father  89 from Alzheimer's dementia and also had a coronary stent in his 70s. She has a brother and sister. Her brother has hyperlipidemia. Her sister had Cushing syndrome and adrenalectomy at age 14.    MEDICATIONS:   Current Outpatient Medications   Medication Instructions   • ascorbic acid (VITAMIN C) 250 mg, Daily   • azelastine (ASTELIN) 137 mcg (0.1 %) nasal spray    • benzonatate (TESSALON) 100 mg, oral, 3 times daily PRN   • biotin 1,000 mcg, Daily   • cholecalciferol (vitamin D3) 1,000 Units, Daily   • cholecalciferol, vitD3,/vit K2 (VITAMIN D3-VITAMIN K2 ORAL) 1 tablet, Daily   • dexAMETHasone (DECADRON) 4 mg tablet 8 mg once at 11 pm the night before the blood draw next day for cortisol around 8 am.   • estradioL (VIVELLE-DOT) 0.05 mg/24 hr semiweekly patch    • GLUTATHIONE ORAL 1 tablet, Daily   • inulin (PREBIOTIC FIBER ORAL) 1 tablet, Daily   • Lactobacillus acidophilus (PROBIOTIC ACIDOPHILUS ORAL) 1 tablet, Daily   • multivitamin tablet 1 tablet, Daily   • nystatin (MYCOSTATIN) 100,000 unit/gram cream 2 times daily   • nystatin (MYCOSTATIN) 500,000 unit tablet    • nystatin-triamcinolone (MYCOLOG II) ointment APPLY TWICE DAILY TO SKIN AROUND FINGERNAILS FOR 2-3 WEEKS   • progesterone (PROMETRIUM) 100 mg capsule    • SAW PALMETTO ORAL 1 tablet, Daily     ALLERGIES: NKDA    REVIEW OF SYSTEMS: Aside from what is mentioned above, a 14 point review of systems was performed and was negative.    PHYSICAL EXAMINATION:  Visit Vitals  /70   Pulse (!) 53   Resp 15   Ht  "1.549 m (5' 1\")   Wt 54.7 kg (120 lb 8 oz)   BMI 22.77 kg/m²   Body surface area is 1.53 meters squared.  General: Pleasant and in no acute distress.  HEENT: No corneal arcus or xanthelasmas.  Sclerae are anicteric.  Nares patent.  Mucous membranes moist.  Neck: Supple.  JVP is 4 cm/H2O.  Carotids are equal with no audible bruits.    Heart: Regular.  Normal S1 and S2.  No S4. No S3. No murmur.  Chest: Symmetrical.  Lungs: Clear bilaterally without rales, wheezes nor rhonchi.  Abdomen: Soft, nontender.  No masses or bruits.  No organomegaly.  Normal bowel sounds.  Extremities: No cyanosis, clubbing or edema.  Distal pulses are palpable.  Skin: Warm and dry and well perfused.  Neurologic: Alert and oriented ×3.  Cranial nerves II through XII are intact.  Psychiatric: Normal mood, affect & judgment.    DIAGNOSTIC DATA:    EKG: Sinus bradycardia, normal axis and intervals. This is a normal tracing.    Labs:   Lab Results   Component Value Date     06/07/2025    K 5.0 06/07/2025    BUN 25 06/07/2025    CREATININE 0.86 06/07/2025    EGFR 75 06/07/2025    WBC 10.66 (H) 04/04/2022    HGB 14.7 04/04/2022     04/04/2022    AST 32 04/04/2022    ALT 42 04/04/2022    GLUCOSE 100 06/07/2025       Lipid Profile:     No results found for: \"CHOL\"  No results found for: \"TRIG\"  No results found for: \"HDL\"  No results found for: \"LDLCALC\"        IMPRESSION/RECOMMENDATIONS:  Dyspnea on exertion - her symptoms have been prevalent since she had COVID-19 a few years back. On one hand she is able to do 30 minutes on the stationary bicycle and Pilates classes but on the other hand has trouble with steps and housecleaning. Given the unexplained dyspnea I have asked her undergo a cardiopulmonary exercise test (CPET). I highly doubt a cardiac etiology to this given her normal examination and normal twelve-lead EKG.  Possible long COVID syndrome - given her concern for this I have asked her to undergo an ANSAR test to assess " sympathetic and parasympathetic axes and autonomic nervous system function or dysfunction.  Mild hyperlipidemia - I reviewed her lipid profile trend outlined above. She has had issues with hypercortisolemia, potentially iatrogenic from supplements, and I told her this could contribute to this problem more could be aggravated by postmenopausal state.  Cushing syndrome - this may be iatrogenic from supplements. She is following with Yamileth Cole and has had dexamethasone suppression testing.    Dr. Rubio, thank you for allowing me to share in the care of your patient.  I asked Jalil to return on an as a basis unless something shows up on her testing.  If you have any further questions, please do not hesitate to contact me.    Sincerely,    Cristino Nguyen D.O., FACC

## 2025-06-25 NOTE — TELEPHONE ENCOUNTER
SG - Pt was seen by Dr. Nguyen today and needs to schedule an ANSAR test. She also needs to have a cardiopulmonary stress test. She would like to do both tests on the same day, if possible. Pt asked for a few dates and times. Please advise. Thanks

## 2025-06-26 ENCOUNTER — LAB REQUISITION (OUTPATIENT)
Dept: LAB | Facility: HOSPITAL | Age: 65
End: 2025-06-26
Attending: OTOLARYNGOLOGY
Payer: MEDICARE

## 2025-06-26 DIAGNOSIS — J32.4 CHRONIC PANSINUSITIS: ICD-10-CM

## 2025-06-26 PROCEDURE — 87070 CULTURE OTHR SPECIMN AEROBIC: CPT | Performed by: OTOLARYNGOLOGY

## 2025-06-26 NOTE — TELEPHONE ENCOUNTER
I can offer an ANSAR test for the below dates:     7/24 at either 10am or 1pm   8/14 at either 10am or 1pm   8/21 or 8/22 @1pm    Please let me know what pt can do. TY

## 2025-06-26 NOTE — TELEPHONE ENCOUNTER
SG-LVM for pt to call back and schedule ANSAR, and that there are no available appointments where the stress test can be done on the same day.

## 2025-06-28 LAB
ACTH PLAS-MCNC: 12 PG/ML (ref 6–50)
CORTIS SERPL-MCNC: 4.3 MCG/DL
DHEA-S SERPL-MCNC: 174 MCG/DL (ref 9–118)
IGF-I SERPL-MCNC: 235 NG/ML (ref 41–279)
IGF-I Z-SCORE SERPL: 1.6 SD
MICROORGANISM SPEC CULT: NORMAL
MICROORGANISM SPEC CULT: NORMAL

## 2025-07-03 ENCOUNTER — TELEPHONE (OUTPATIENT)
Dept: ENDOCRINOLOGY | Facility: CLINIC | Age: 65
End: 2025-07-03

## 2025-07-03 NOTE — TELEPHONE ENCOUNTER
Central schedule could not confirm if pt was authorized for MRI. Secondary [employer] may require pre-cert. Pt has no location preference and [facility] has been updated in referral as location.  Please advise. Thanks in advance.

## 2025-07-23 ENCOUNTER — TELEPHONE (OUTPATIENT)
Dept: ENDOCRINOLOGY | Facility: CLINIC | Age: 65
End: 2025-07-23

## 2025-07-24 ENCOUNTER — CLINICAL SUPPORT (OUTPATIENT)
Dept: CARDIOLOGY | Facility: CLINIC | Age: 65
End: 2025-07-24
Attending: INTERNAL MEDICINE
Payer: MEDICARE

## 2025-07-24 VITALS
HEART RATE: 69 BPM | WEIGHT: 120 LBS | HEIGHT: 61 IN | OXYGEN SATURATION: 96 % | DIASTOLIC BLOOD PRESSURE: 94 MMHG | BODY MASS INDEX: 22.66 KG/M2 | SYSTOLIC BLOOD PRESSURE: 138 MMHG | RESPIRATION RATE: 19 BRPM

## 2025-07-24 DIAGNOSIS — G90.9 DISORDER OF AUTONOMIC NERVOUS SYSTEM: ICD-10-CM

## 2025-07-24 DIAGNOSIS — U09.9 LONG COVID: ICD-10-CM

## 2025-07-24 DIAGNOSIS — R06.09 DYSPNEA ON EXERTION: ICD-10-CM

## 2025-07-24 PROCEDURE — 99999 PR OFFICE/OUTPT VISIT,PROCEDURE ONLY: CPT | Performed by: INTERNAL MEDICINE

## 2025-07-25 ENCOUNTER — TELEPHONE (OUTPATIENT)
Dept: SCHEDULING | Facility: CLINIC | Age: 65
End: 2025-07-25
Payer: COMMERCIAL

## 2025-07-25 ENCOUNTER — RESULTS FOLLOW-UP (OUTPATIENT)
Dept: CARDIOLOGY | Facility: CLINIC | Age: 65
End: 2025-07-25
Payer: MEDICARE

## 2025-07-25 NOTE — TELEPHONE ENCOUNTER
CV CARDIOPULMONARY STRESS TEST [employer] plan is not active.  Per [employer] plan termed on 5/31/2024.  Medicare A&B does not require an auth.     Please confirm with patient what insurance she has.

## 2025-07-25 NOTE — RESULT ENCOUNTER NOTE
Brianna,    Please call Jalil to review her ANSAR. She is concerned about long COVID and her ANSAR is certainly abnormal. She also is being evaluated for Cushing syndrome which may be iatrogenic from multiple supplements taken. Please ask her to review these (I am not sure if she sees a Functional Medicine specialist) and try to limit any that may have anticholinergic effects given her low parasympathetic level at baseline

## 2025-07-30 ENCOUNTER — HOSPITAL ENCOUNTER (OUTPATIENT)
Dept: RADIOLOGY | Facility: HOSPITAL | Age: 65
Discharge: HOME | End: 2025-07-30
Attending: INTERNAL MEDICINE
Payer: MEDICARE

## 2025-07-30 DIAGNOSIS — R79.89 ELEVATED CORTISOL LEVEL: ICD-10-CM

## 2025-07-30 DIAGNOSIS — R06.09 DYSPNEA ON EXERTION: ICD-10-CM

## 2025-07-30 PROCEDURE — 71250 CT THORAX DX C-: CPT

## 2025-07-30 RX ORDER — GADOBUTROL 604.72 MG/ML
0.1 INJECTION INTRAVENOUS ONCE
Status: COMPLETED | OUTPATIENT
Start: 2025-07-30 | End: 2025-07-30

## 2025-07-30 RX ADMIN — GADOBUTROL 5.4 ML: 604.72 INJECTION INTRAVENOUS at 10:35

## 2025-07-31 ENCOUNTER — TELEPHONE (OUTPATIENT)
Dept: CARDIOLOGY | Facility: HOSPITAL | Age: 65
End: 2025-07-31
Payer: COMMERCIAL

## 2025-08-04 ENCOUNTER — HOSPITAL ENCOUNTER (OUTPATIENT)
Dept: CARDIOLOGY | Facility: HOSPITAL | Age: 65
Discharge: HOME | End: 2025-08-04
Attending: INTERNAL MEDICINE
Payer: MEDICARE

## 2025-08-04 VITALS
HEIGHT: 61 IN | SYSTOLIC BLOOD PRESSURE: 152 MMHG | BODY MASS INDEX: 22.66 KG/M2 | OXYGEN SATURATION: 98 % | WEIGHT: 120 LBS | DIASTOLIC BLOOD PRESSURE: 96 MMHG | HEART RATE: 63 BPM | RESPIRATION RATE: 16 BRPM

## 2025-08-04 DIAGNOSIS — R06.09 DYSPNEA ON EXERTION: ICD-10-CM

## 2025-08-04 DIAGNOSIS — U09.9 LONG COVID: ICD-10-CM

## 2025-08-04 DIAGNOSIS — G90.9 DISORDER OF AUTONOMIC NERVOUS SYSTEM: ICD-10-CM

## 2025-08-04 LAB
STRESS BASELINE BP: NORMAL MMHG
STRESS BASELINE HR: 75 BPM
STRESS O2 SAT REST: 100 %
STRESS PERCENT HR: 96 %
STRESS POST ESTIMATED WORKLOAD: 13.4 METS
STRESS POST EXERCISE DUR MIN: 17 MIN
STRESS POST EXERCISE DUR SEC: 50 SEC
STRESS POST O2 SAT PEAK: 99 %
STRESS POST PEAK BP: NORMAL MMHG
STRESS POST PEAK HR: 148 BPM
STRESS TARGET HR: 131 BPM

## 2025-08-04 PROCEDURE — 94621 CARDIOPULM EXERCISE TESTING: CPT

## 2025-08-04 PROCEDURE — 94621 CARDIOPULM EXERCISE TESTING: CPT | Mod: 26 | Performed by: INTERNAL MEDICINE

## 2025-08-07 ENCOUNTER — OFFICE VISIT (OUTPATIENT)
Dept: ENDOCRINOLOGY | Facility: CLINIC | Age: 65
End: 2025-08-07
Payer: MEDICARE

## 2025-08-07 VITALS
HEART RATE: 78 BPM | BODY MASS INDEX: 23.6 KG/M2 | DIASTOLIC BLOOD PRESSURE: 82 MMHG | HEIGHT: 61 IN | WEIGHT: 125 LBS | OXYGEN SATURATION: 99 % | RESPIRATION RATE: 16 BRPM | SYSTOLIC BLOOD PRESSURE: 120 MMHG

## 2025-08-07 DIAGNOSIS — R79.89 ELEVATED CORTISOL LEVEL: Primary | ICD-10-CM

## 2025-08-07 PROCEDURE — 99215 OFFICE O/P EST HI 40 MIN: CPT | Performed by: INTERNAL MEDICINE

## 2025-08-07 RX ORDER — FERROUS SULFATE 325(65) MG
325 TABLET, DELAYED RELEASE (ENTERIC COATED) ORAL
COMMUNITY

## 2025-08-07 NOTE — PROGRESS NOTES
Endocrinology Report       Jalil Guerra is a 65 y.o. female who presents for follow up of     1, abnormal dexamethasone suppression test  2, elevated urine free cortisol    - First seen 5/2025  Patient noticed 20 pounds of weight gain recently.  She is a .  Exercise 5 days a week.  She did not change any of her lifestyles.  In addition, patient reports symptoms of hair thinning, hair loss, moon face, joint aches, easy bruising, muscle weakness, feeling irritable, insomnia.  She also noticed worsening bone mineral density.  She used to be osteopenic which has progressed into osteopenia.  She has received Reclast from rheumatology.  Her blood pressure is higher.  Her glucose is higher as well.  Due to all of the symptoms, she had a blood work test done for Cushing syndrome.  She had an abnormal 1 mg dexamethasone suppression test and elevated 24-hour urine free cortisol.  Patient has migraine.  Noticed her headache last longer than before.  Reports blurry vision.  No visual field loss.  Patient reported a history of infertility status post treatment.  She had 5 miscarriages and has 1 child.  Denies any gestational diabetes or high blood pressure during pregnancy.  Reports family history of Cushing syndrome with one of her sisters.  Her sister was diagnosed with Cushing syndrome at age 14.  Status post bilateral adrenalectomy.  Developed Louie syndrome and required radiation treatment.    Patient brought in her lab tests as below  4/26/2025 1 mg dexamethasone suppression test cortisol level 8.1 dexamethasone 177  4/8/2025 AM cortisol 27.2  24-hour urine free cortisol 160.2 creatinine 1.02    DHEA-S 291  Total cholesterol 215  HDL 74 triglyceride 82   A1c 5.8   normal CBC except hematocrit 45.3 ROYAL titer positive  normal CMP   PTH 62 calcium 9.5 TSH 1.06 Free T4 1.5 ACTH 26 FSH 30.7 LH 6.3 prolactin 9.5 estradiol 35 testosterone 29 Free testosterone 3.6  7/5/2024 CEA 3.4  2/8/2024 24  urine calcium 252  2/26/2020 five 24-hour urine free cortisol 133  1/20/2025 normal TFT normal CMP DHEA-S 281  11/25/2024 A1c 6.3    Upon further questioning, patient is seeing a functional doctor.  Taking multiple supplements.  Her supplement list was scanned in the chart.  There was one substance called ABD5-plus which contains porcine adrenal gland.  The list also contains several other substances to boost adrenal function.  I am not sure if they contain any steroids.      Intervals:   - patient has stopped all supplement.   - she says she still feeling the same. Not much worsening. Gained 5 lbs. Appetite is good. Eating normal.   - had a lump on the R elbow, which disappeared 3-4 days. Had pain and received steroids injections at the R elbow 7/21.    - Her BP and BG is stable. Denies recurrent infections. No easy bruising.   - She is also off progesterone and estradial for 2 weeks. She says she didn't notice much changes clinically with HRT.   - I repeated 1 mg Dex (cortisol 15.1), 8 mg Dex (cortisol 4.3).    - ACTH was 12 (after 8 mg Dex sup). DHEAs elevated (174).   - MRI of brain: negative for pituitary tumor.   - patient had CT for SOB.  It showed small lung nodules, which were evaluated by pulmonary. Plan for repeat imaging in 6 months. CT showed no adrenal adenoma.     Medical History:  Past Medical History:   Diagnosis Date    Acne     Constipation     Hyperlipidemia     Hypertension     Migraine     Osteoporosis        Surgical History:   Past Surgical History   Procedure Laterality Date    Ankle surgery Right     Reconstruction    Cholecystectomy      Laparoscopy, right Salpingo-Oophorectomy/left Salpingectomy N/A 2/26/2019    Performed by Kennedy Montanez MD at [facility] OR    Pubovaginal sling         Family History:  Family History   Problem Relation Name Age of Onset    Diverticulitis Biological Mother      Heart failure Biological Mother      Heart attack Biological Father          stent placed    Cushing  "syndrome Biological Sister faith     Hyperlipidemia Biological Brother aiyana     Breast cancer Neg Hx      Ovarian cancer Neg Hx      Colon cancer Neg Hx         Social history:  Social History     Tobacco Use    Smoking status: Never    Smokeless tobacco: Never   Vaping Use    Vaping status: Never Used   Substance Use Topics    Alcohol use: Not Currently    Drug use: Never       Medication(active prior to today):  Current Outpatient Medications   Medication Sig Dispense Refill    ascorbic acid (VITAMIN C) 250 mg tablet Take 250 mg by mouth daily.      azelastine (ASTELIN) 137 mcg (0.1 %) nasal spray  (Patient taking differently: Administer 2 sprays into each nostril as needed for allergies.)      cholecalciferol, vitD3,/vit K2 (VITAMIN D3-VITAMIN K2 ORAL) Take 1 tablet by mouth once daily.      ferrous sulfate 325 mg (65 mg iron) EC tablet Take 325 mg by mouth 3 (three) times a day with meals.      Lactobacillus acidophilus (PROBIOTIC ACIDOPHILUS ORAL) Take 1 tablet by mouth once daily.      multivitamin tablet Take 1 tablet by mouth daily.      estradioL (VIVELLE-DOT) 0.05 mg/24 hr semiweekly patch  (Patient not taking: Reported on 8/7/2025)      progesterone (PROMETRIUM) 100 mg capsule  (Patient not taking: Reported on 8/7/2025)       No current facility-administered medications for this visit.       Allergies:  No known drug allergies    ROS:  All other systems reviewed and negative except as noted in HPI    PE:  Vitals:    08/07/25 1302   BP: 120/82   BP Location: Left upper arm   Patient Position: Sitting   Pulse: 78   Resp: 16   SpO2: 99%   Weight: 56.7 kg (125 lb)   Height: 1.549 m (5' 1\")     Body mass index is 23.62 kg/m².    Constitutional: well-developed. well- nourished  HENT: Head: Normocephalic and atraumatic.   Eyes: Conjunctivae and EOM are normal. PERRL  Cardiovascular: Normal rate, regular rhythm and normal heart sounds.    Pulmonary/Chest: Effort normal and breath sounds normal.   Musculoskeletal: " "No edema or deformity. No clubbing  Neurological: Alert and oriented to person, place, and time.     Labs:   No results found for: \"HGBA1C\"       Lab Results   Component Value Date    GLUCOSE 100 06/07/2025    CALCIUM 9.6 06/07/2025     06/07/2025    K 5.0 06/07/2025    CO2 27 06/07/2025     06/07/2025    BUN 25 06/07/2025    CREATININE 0.86 06/07/2025       Lab Results   Component Value Date    ALT 42 04/04/2022    AST 32 04/04/2022    ALKPHOS 57 04/04/2022    BILITOT 0.7 04/04/2022       No results found for: \"TSH\"  No results found for: \"FREET4\", \"I5AMPJCNZ\", \"T4F\"  No results found for: \"J7VVLGV\", \"T3FREE\"  No results found for: \"TSI\"  Lab Results   Component Value Date    CALCIUM 9.6 06/07/2025     No results found for: \"VITD25\"    Component      Latest Ref Rng 6/3/2025 6/5/2025 6/7/2025 6/24/2025   DRAW DATE 1  6,032,025      DRAW TIME 1  11PM      CORTISOL, SALIVA SAMPLE 1      mcg/dL  0.30      DRAW DATE 2  6,042,025      DRAW TIME 2  11PM      CORTISOL, SALIVA SAMPLE 2      mcg/dL  0.15      Total Volume      mL 1,400       Cortisol Free Ur      4.0 - 50.0 mcg/24 h 184.0 (H)       CORTISOL, FREE, URINE      mcg/g creat 191.7 (H)       Creatinine, Urine      0.50 - 2.15 g/24 h 0.96       IGF 1, LC/MS      41 - 279 ng/mL    235    Z Score, Female      -2.0 - 2.0 SD    1.6    Cortisol, Total      mcg/dL   15.1  4.3    ACTH, Plasma      6 - 50 pg/mL    12    Dhea Sulfate      9 - 118 mcg/dL    174 (H)    6/7 1 mg dex  6/24  8 mg Dex.   ? Elevated DHEAs      Component      Latest Ref Rng 6/7/2025 7/8/2025   Glucose      65 - 139 mg/dL 100 (C)    BUN      7 - 25 mg/dL 25     Creatinine      0.50 - 1.05 mg/dL 0.86     eGFR      > OR = 60 mL/min/1.73m2 75     Bun/Creatinine Ratio      6 - 22 (calc) SEE NOTE:     Sodium      135 - 146 mmol/L 140     Potassium, Bld      3.5 - 5.3 mmol/L 5.0     Chloride      98 - 110 mmol/L 105     CO2      20 - 32 mmol/L 27     Calcium      8.6 - 10.4 mg/dL 9.6     Total " Volume      mL  NOT GIVEN    Cortisol Free Ur      4.0 - 50.0 mcg/24 h  SEE NOTE    CORTISOL, FREE, URINE      mcg/g creat  161.8 (H)    Creatinine, Urine      0.50 - 2.15 g/24 h  SEE NOTE    IGF 1, LC/MS      41 - 279 ng/mL     Z Score, Female      -2.0 - 2.0 SD     Cortisol, Total      mcg/dL 15.1     ACTH, Plasma      6 - 50 pg/mL     Dhea Sulfate      9 - 118 mcg/dL        Legend:  (H) High  (C) Corrected  Images:   6/30/2025 MRI  IMPRESSION:  1. No acute infarct. No acute hemorrhage. No mass, mass effect or abnormal  enhancement.  2. No abnormal pituitary underenhancement to suggest microadenoma.    7/2025 CT Chest   There is thickening of the left adrenal gland.    ASSESSMENT/PLAN   1, abnormal dexamethasone suppression test  2, elevated urine free cortisol  - Test was done to rule out Cushing syndrome in the setting of weight gain, and a broad spectrum of symptoms for the last year.  Two abnormal screening tests do confirm Cushing syndrome.  However I suspect the etiology was iatrogenic given her long list of supplements containing porcine adrenal glands.  I recommended the patient to stop all the supplements for 2 weeks and retest at last visit.  All the tests came back positive for CS. Although direct measurement of the steroids after discontinuing over-the-counter adrenal support supplements should be expected to come back to normal range, full normalization of the hypothalamic-pituitary-adrenal (HPA) axis can take several weeks to months, depending on the dose, duration, and specific steroids involved.     - patient has imaging ruled out pituitary rumor or adrenal tumors.   - noted patient is off HRT 2 weeks ago.  She received steroids injection at her elbow 2 wks ago.     Wait another 2 weeks,   Repeat AM lab.  Will repeat 1 mg Dex sup, 24 UFC. Further work up if indicated.       Orders Placed This Encounter   Procedures    Cortisol     Release to patient:   Immediate [1]    ACTH, Plasma     Release to  patient:   Immediate [1]    Testosterone, Free, Bioavailable and Total     Release to patient:   Immediate [1]    DHEA-sulfate     Release to patient:   Immediate     Release to patient:   Immediate [1]    Basic metabolic panel     Release to patient:   Immediate [1]    Cortisol, Free, 24h Urine with Creatinine     Release to patient:   Immediate     Release to patient:   Immediate [1]    Creatinine, urine, 24 hour     Release to patient:   Immediate     Release to patient:   Immediate [1]       RTC in  3 months      Time Spent  I spent 43 minutes on this date of service performing the following activities: obtaining history, performing examination, entering orders, documenting, preparing for visit, obtaining / reviewing records, providing counseling and education, independently reviewing study/studies, communicating results, and coordinating care.    Thank you very much for allowing me participating in the patient's care. Please feel free to contact me if any questions.     Electronically signed by Yamileth Cole MD

## 2025-08-07 NOTE — PATIENT INSTRUCTIONS
Get AM lab test and 24 hour urine test done.   Will order 1 mg Dex suppression test after that.     Return in 4 months

## 2025-08-07 NOTE — LETTER
August 7, 2025     Diogo Rose DO  [address]  [address]  [facility] [address]    Patient: Jalil Guerra  YOB: 1960  Date of Visit: 8/7/2025      Dear Dr. Rose:    Thank you for referring Jalil Guerra to me for evaluation. Below are my notes for this consultation.    If you have questions, please do not hesitate to call me. I look forward to following your patient along with you.         Sincerely,        Yamileth Cole MD        CC: No Recipients    Yamileth Cole MD  8/7/2025  5:42 PM  Sign when Signing Visit         Endocrinology Report       Jalil Guerra is a 65 y.o. female who presents for follow up of     1, abnormal dexamethasone suppression test  2, elevated urine free cortisol    - First seen 5/2025  Patient noticed 20 pounds of weight gain recently.  She is a .  Exercise 5 days a week.  She did not change any of her lifestyles.  In addition, patient reports symptoms of hair thinning, hair loss, moon face, joint aches, easy bruising, muscle weakness, feeling irritable, insomnia.  She also noticed worsening bone mineral density.  She used to be osteopenic which has progressed into osteopenia.  She has received Reclast from rheumatology.  Her blood pressure is higher.  Her glucose is higher as well.  Due to all of the symptoms, she had a blood work test done for Cushing syndrome.  She had an abnormal 1 mg dexamethasone suppression test and elevated 24-hour urine free cortisol.  Patient has migraine.  Noticed her headache last longer than before.  Reports blurry vision.  No visual field loss.  Patient reported a history of infertility status post treatment.  She had 5 miscarriages and has 1 child.  Denies any gestational diabetes or high blood pressure during pregnancy.  Reports family history of Cushing syndrome with one of her sisters.  Her sister was diagnosed with Cushing syndrome at age 14.  Status post bilateral adrenalectomy.  Developed Louie  syndrome and required radiation treatment.    Patient brought in her lab tests as below  4/26/2025 1 mg dexamethasone suppression test cortisol level 8.1 dexamethasone 177  4/8/2025 AM cortisol 27.2  24-hour urine free cortisol 160.2 creatinine 1.02    DHEA-S 291  Total cholesterol 215  HDL 74 triglyceride 82   A1c 5.8   normal CBC except hematocrit 45.3 ROYAL titer positive  normal CMP   PTH 62 calcium 9.5 TSH 1.06 Free T4 1.5 ACTH 26 FSH 30.7 LH 6.3 prolactin 9.5 estradiol 35 testosterone 29 Free testosterone 3.6  7/5/2024 CEA 3.4  2/8/2024 24 urine calcium 252  2/26/2020 five 24-hour urine free cortisol 133  1/20/2025 normal TFT normal CMP DHEA-S 281  11/25/2024 A1c 6.3    Upon further questioning, patient is seeing a functional doctor.  Taking multiple supplements.  Her supplement list was scanned in the chart.  There was one substance called ABD5-plus which contains porcine adrenal gland.  The list also contains several other substances to boost adrenal function.  I am not sure if they contain any steroids.      Intervals:   - patient has stopped all supplement.   - she says she still feeling the same. Not much worsening. Gained 5 lbs. Appetite is good. Eating normal.   - had a lump on the R elbow, which disappeared 3-4 days. Had pain and received steroids injections at the R elbow 7/21.    - Her BP and BG is stable. Denies recurrent infections. No easy bruising.   - She is also off progesterone and estradial for 2 weeks. She says she didn't notice much changes clinically with HRT.   - I repeated 1 mg Dex (cortisol 15.1), 8 mg Dex (cortisol 4.3).    - ACTH was 12 (after 8 mg Dex sup). DHEAs elevated (174).   - MRI of brain: negative for pituitary tumor.   - patient had CT for SOB.  It showed small lung nodules, which were evaluated by pulmonary. Plan for repeat imaging in 6 months. CT showed no adrenal adenoma.     Medical History:  Past Medical History:   Diagnosis Date   • Acne    • Constipation    •  Hyperlipidemia    • Hypertension    • Migraine    • Osteoporosis        Surgical History:   Past Surgical History   Procedure Laterality Date   • Ankle surgery Right     Reconstruction   • Cholecystectomy     • Laparoscopy, right Salpingo-Oophorectomy/left Salpingectomy N/A 2/26/2019    [place] [facility] [place]   • Pubovaginal sling         Family History:  Family History   Problem Relation Name Age of Onset   • Diverticulitis Biological Mother     • Heart failure Biological Mother     • Heart attack Biological Father          stent placed   • Cushing syndrome Biological Sister faith    • Hyperlipidemia Biological Brother aiyana    • Breast cancer Neg Hx     • Ovarian cancer Neg Hx     • Colon cancer Neg Hx         Social history:  Social History     Tobacco Use   • Smoking status: Never   • Smokeless tobacco: Never   Vaping Use   • Vaping status: Never Used   Substance Use Topics   • Alcohol use: Not Currently   • Drug use: Never       Medication(active prior to today):  Current Outpatient Medications   Medication Sig Dispense Refill   • ascorbic acid (VITAMIN C) 250 mg tablet Take 250 mg by mouth daily.     • azelastine (ASTELIN) 137 mcg (0.1 %) nasal spray  (Patient taking differently: Administer 2 sprays into each nostril as needed for allergies.)     • cholecalciferol, vitD3,/vit K2 (VITAMIN D3-VITAMIN K2 ORAL) Take 1 tablet by mouth once daily.     • ferrous sulfate 325 mg (65 mg iron) EC tablet Take 325 mg by mouth 3 (three) times a day with meals.     • Lactobacillus acidophilus (PROBIOTIC ACIDOPHILUS ORAL) Take 1 tablet by mouth once daily.     • multivitamin tablet Take 1 tablet by mouth daily.     • estradioL (VIVELLE-DOT) 0.05 mg/24 hr semiweekly patch  (Patient not taking: Reported on 8/7/2025)     • progesterone (PROMETRIUM) 100 mg capsule  (Patient not taking: Reported on 8/7/2025)       No current facility-administered medications for this visit.       Allergies:  No known drug  "allergies    ROS:  All other systems reviewed and negative except as noted in HPI    PE:  Vitals:    08/07/25 1302   BP: 120/82   BP Location: Left upper arm   Patient Position: Sitting   Pulse: 78   Resp: 16   SpO2: 99%   Weight: 56.7 kg (125 lb)   Height: 1.549 m (5' 1\")     Body mass index is 23.62 kg/m².    Constitutional: well-developed. well- nourished  HENT: Head: Normocephalic and atraumatic.   Eyes: Conjunctivae and EOM are normal. PERRL  Cardiovascular: Normal rate, regular rhythm and normal heart sounds.    Pulmonary/Chest: Effort normal and breath sounds normal.   Musculoskeletal: No edema or deformity. No clubbing  Neurological: Alert and oriented to person, place, and time.     Labs:   No results found for: \"HGBA1C\"       Lab Results   Component Value Date    GLUCOSE 100 06/07/2025    CALCIUM 9.6 06/07/2025     06/07/2025    K 5.0 06/07/2025    CO2 27 06/07/2025     06/07/2025    BUN 25 06/07/2025    CREATININE 0.86 06/07/2025       Lab Results   Component Value Date    ALT 42 04/04/2022    AST 32 04/04/2022    ALKPHOS 57 04/04/2022    BILITOT 0.7 04/04/2022       No results found for: \"TSH\"  No results found for: \"FREET4\", \"Q9EAZISSK\", \"T4F\"  No results found for: \"P2WKEPO\", \"T3FREE\"  No results found for: \"TSI\"  Lab Results   Component Value Date    CALCIUM 9.6 06/07/2025     No results found for: \"VITD25\"    Component      Latest Ref Rng 6/3/2025 6/5/2025 6/7/2025 6/24/2025   DRAW DATE 1  6,032,025      DRAW TIME 1  11PM      CORTISOL, SALIVA SAMPLE 1      mcg/dL  0.30      DRAW DATE 2  6,042,025      DRAW TIME 2  11PM      CORTISOL, SALIVA SAMPLE 2      mcg/dL  0.15      Total Volume      mL 1,400       Cortisol Free Ur      4.0 - 50.0 mcg/24 h 184.0 (H)       CORTISOL, FREE, URINE      mcg/g creat 191.7 (H)       Creatinine, Urine      0.50 - 2.15 g/24 h 0.96       IGF 1, LC/MS      41 - 279 ng/mL    235    Z Score, Female      -2.0 - 2.0 SD    1.6    Cortisol, Total      mcg/dL   15.1 "  4.3    ACTH, Plasma      6 - 50 pg/mL    12    Dhea Sulfate      9 - 118 mcg/dL    174 (H)    6/7 1 mg dex  6/24  8 mg Dex.   ? Elevated DHEAs      Component      Latest Ref Rng 6/7/2025 7/8/2025   Glucose      65 - 139 mg/dL 100 (C)    BUN      7 - 25 mg/dL 25     Creatinine      0.50 - 1.05 mg/dL 0.86     eGFR      > OR = 60 mL/min/1.73m2 75     Bun/Creatinine Ratio      6 - 22 (calc) SEE NOTE:     Sodium      135 - 146 mmol/L 140     Potassium, Bld      3.5 - 5.3 mmol/L 5.0     Chloride      98 - 110 mmol/L 105     CO2      20 - 32 mmol/L 27     Calcium      8.6 - 10.4 mg/dL 9.6     Total Volume      mL  NOT GIVEN    Cortisol Free Ur      4.0 - 50.0 mcg/24 h  SEE NOTE    CORTISOL, FREE, URINE      mcg/g creat  161.8 (H)    Creatinine, Urine      0.50 - 2.15 g/24 h  SEE NOTE    IGF 1, LC/MS      41 - 279 ng/mL     Z Score, Female      -2.0 - 2.0 SD     Cortisol, Total      mcg/dL 15.1     ACTH, Plasma      6 - 50 pg/mL     Dhea Sulfate      9 - 118 mcg/dL        Legend:  (H) High  (C) Corrected  Images:   6/30/2025 MRI  IMPRESSION:  1. No acute infarct. No acute hemorrhage. No mass, mass effect or abnormal  enhancement.  2. No abnormal pituitary underenhancement to suggest microadenoma.    7/2025 CT Chest   There is thickening of the left adrenal gland.    ASSESSMENT/PLAN   1, abnormal dexamethasone suppression test  2, elevated urine free cortisol  - Test was done to rule out Cushing syndrome in the setting of weight gain, and a broad spectrum of symptoms for the last year.  Two abnormal screening tests do confirm Cushing syndrome.  However I suspect the etiology was iatrogenic given her long list of supplements containing porcine adrenal glands.  I recommended the patient to stop all the supplements for 2 weeks and retest at last visit.  All the tests came back positive for CS. Although direct measurement of the steroids after discontinuing over-the-counter adrenal support supplements should be expected to come  back to normal range, full normalization of the hypothalamic-pituitary-adrenal (HPA) axis can take several weeks to months, depending on the dose, duration, and specific steroids involved.     - patient has imaging ruled out pituitary rumor or adrenal tumors.   - noted patient is off HRT 2 weeks ago.  She received steroids injection at her elbow 2 wks ago.     Wait another 2 weeks,   Repeat AM lab.  Will repeat 1 mg Dex sup, 24 UFC. Further work up if indicated.       Orders Placed This Encounter   Procedures   • Cortisol     Release to patient:   Immediate [1]   • ACTH, Plasma     Release to patient:   Immediate [1]   • Testosterone, Free, Bioavailable and Total     Release to patient:   Immediate [1]   • DHEA-sulfate     Release to patient:   Immediate     Release to patient:   Immediate [1]   • Basic metabolic panel     Release to patient:   Immediate [1]   • Cortisol, Free, 24h Urine with Creatinine     Release to patient:   Immediate     Release to patient:   Immediate [1]   • Creatinine, urine, 24 hour     Release to patient:   Immediate     Release to patient:   Immediate [1]       RTC in  3 months      Time Spent  I spent 43 minutes on this date of service performing the following activities: obtaining history, performing examination, entering orders, documenting, preparing for visit, obtaining / reviewing records, providing counseling and education, independently reviewing study/studies, communicating results, and coordinating care.    Thank you very much for allowing me participating in the patient's care. Please feel free to contact me if any questions.     Electronically signed by Yamileth Cole MD

## 2025-08-22 LAB
CORTIS F 24H UR-MRATE: NORMAL
CORTIS F/CREAT 24H UR: NORMAL
CREAT 24H UR-MRATE: 0.5 G/24 H (ref 0.5–2.15)
CREAT 24H UR-MRATE: NORMAL G/(24.H)
SPECIMEN VOL 24H UR: NORMAL L

## 2025-08-24 LAB
ACTH PLAS-MCNC: NORMAL PG/ML
ALBUMIN SERPL-MCNC: NORMAL G/DL
BUN SERPL-MCNC: 25 MG/DL (ref 7–25)
BUN/CREAT SERPL: NORMAL (CALC) (ref 6–22)
CALCIUM SERPL-MCNC: 9.5 MG/DL (ref 8.6–10.4)
CHLORIDE SERPL-SCNC: 108 MMOL/L (ref 98–110)
CO2 SERPL-SCNC: 29 MMOL/L (ref 20–32)
CORTIS SERPL-MCNC: 21.8 MCG/DL
CREAT SERPL-MCNC: 0.87 MG/DL (ref 0.5–1.05)
DHEA-S SERPL-MCNC: 211 MCG/DL (ref 9–118)
EGFRCR SERPLBLD CKD-EPI 2021: 73 ML/MIN/1.73M2
GLUCOSE SERPL-MCNC: 86 MG/DL (ref 65–99)
POTASSIUM SERPL-SCNC: 4.2 MMOL/L (ref 3.5–5.3)
SHBG SERPL-SCNC: 31 NMOL/L (ref 14–73)
SODIUM SERPL-SCNC: 145 MMOL/L (ref 135–146)
TESTOST FREE SERPL-MCNC: NORMAL PG/ML
TESTOST SERPL-MCNC: NORMAL NG/DL
TESTOSTERONE.FREE+WB SERPL-MCNC: NORMAL NG/DL

## 2025-08-25 LAB
ACTH PLAS-MCNC: 19 PG/ML (ref 6–50)
ALBUMIN SERPL-MCNC: 4.2 G/DL (ref 3.6–5.1)
BUN SERPL-MCNC: 25 MG/DL (ref 7–25)
BUN/CREAT SERPL: NORMAL (CALC) (ref 6–22)
CALCIUM SERPL-MCNC: 9.5 MG/DL (ref 8.6–10.4)
CHLORIDE SERPL-SCNC: 108 MMOL/L (ref 98–110)
CO2 SERPL-SCNC: 29 MMOL/L (ref 20–32)
CORTIS SERPL-MCNC: 21.8 MCG/DL
CREAT SERPL-MCNC: 0.87 MG/DL (ref 0.5–1.05)
DHEA-S SERPL-MCNC: 211 MCG/DL (ref 9–118)
EGFRCR SERPLBLD CKD-EPI 2021: 73 ML/MIN/1.73M2
GLUCOSE SERPL-MCNC: 86 MG/DL (ref 65–99)
POTASSIUM SERPL-SCNC: 4.2 MMOL/L (ref 3.5–5.3)
SHBG SERPL-SCNC: 31 NMOL/L (ref 14–73)
SODIUM SERPL-SCNC: 145 MMOL/L (ref 135–146)
TESTOST FREE SERPL-MCNC: 3 PG/ML (ref 0.2–5)
TESTOST SERPL-MCNC: 24 NG/DL (ref 2–45)
TESTOSTERONE.FREE+WB SERPL-MCNC: 5.7 NG/DL (ref 0.5–8.5)

## 2025-08-28 LAB
CORTIS F 24H UR-MRATE: 85.2 MCG/24 H (ref 4–50)
CORTIS F/CREAT 24H UR: 149.5 MCG/G CREAT
CREAT 24H UR-MRATE: 0.5 G/24 H (ref 0.5–2.15)
CREAT 24H UR-MRATE: 0.57 G/24 H (ref 0.5–2.15)
SPECIMEN VOL 24H UR: 1200 ML

## (undated) DEVICE — ***USE 138152*** ENDOPOUCH RETRIEVE 10MM

## (undated) DEVICE — GLOVE SZ 7 PROTEXIS PI

## (undated) DEVICE — COVER CAMERA LIGHT HANDLE

## (undated) DEVICE — ADHESIVE SKIN DERMABOND ADVANCED 0.7ML

## (undated) DEVICE — ***USE 57698*** SLEEVE FLOWTRON DVT CALF SINGLE USE

## (undated) DEVICE — POUCH STERI DRAPE INSTRUMENT LONG

## (undated) DEVICE — Device

## (undated) DEVICE — TROCAR BLUNT TIP 12 X 100MM

## (undated) DEVICE — TUBING INSUFFLATION PNEUMOSURE HIGH FLOW

## (undated) DEVICE — SUTURE MONOCRYL 4-0 Y426H PS-2 27IN

## (undated) DEVICE — ADHESIVE, SKIN DERMABOND ADV .7 ML

## (undated) DEVICE — COVER LIGHTHANDLE

## (undated) DEVICE — GLOVE SZ 7 LINER PROTEXIS PI BL

## (undated) DEVICE — ***USE 138819***HANDPIECE HARMONIC HAR36 LAPAROSCOPIC

## (undated) DEVICE — SANI-SERVE SLUSH DRAPE SUBSTIT

## (undated) DEVICE — ***USE 56941*** SUTURE VICRYL 0 J603H UR-6

## (undated) DEVICE — TROCAR OPTICAL DUAL PACK 5X100 LOW PROFILE

## (undated) DEVICE — PAD PERI MATERNITY LENGTH

## (undated) DEVICE — NEEDLE SAFE BLUNT 18G

## (undated) DEVICE — PAD GROUND ELECTROSURGICAL W/CORD

## (undated) DEVICE — APPLICATOR CHLORAPREP 26ML ORANGE TINT

## (undated) DEVICE — SOLN IRRIG .9%SOD 1000ML

## (undated) DEVICE — SOLN IRRIG STER WATER 1000ML

## (undated) DEVICE — SYRINGE DISP LUER-LOK 50 CC

## (undated) DEVICE — SYRINGE DISP LUER-LOK 30 CC

## (undated) DEVICE — PACK GYN LAPAROSCOPY TLH

## (undated) DEVICE — TUBE SUCTION 1/4INX20FT STERILE